# Patient Record
Sex: MALE | Race: BLACK OR AFRICAN AMERICAN | NOT HISPANIC OR LATINO | Employment: UNEMPLOYED | URBAN - METROPOLITAN AREA
[De-identification: names, ages, dates, MRNs, and addresses within clinical notes are randomized per-mention and may not be internally consistent; named-entity substitution may affect disease eponyms.]

---

## 2023-05-02 ENCOUNTER — OFFICE VISIT (OUTPATIENT)
Dept: PRIMARY CARE | Facility: CLINIC | Age: 33
End: 2023-05-02
Payer: COMMERCIAL

## 2023-05-02 VITALS
BODY MASS INDEX: 27.98 KG/M2 | SYSTOLIC BLOOD PRESSURE: 120 MMHG | HEART RATE: 71 BPM | DIASTOLIC BLOOD PRESSURE: 78 MMHG | HEIGHT: 69 IN | OXYGEN SATURATION: 99 % | WEIGHT: 188.9 LBS | TEMPERATURE: 97.6 F

## 2023-05-02 DIAGNOSIS — G56.00 CARPAL TUNNEL SYNDROME, UNSPECIFIED LATERALITY: Primary | ICD-10-CM

## 2023-05-02 DIAGNOSIS — M25.512 LEFT SHOULDER PAIN, UNSPECIFIED CHRONICITY: ICD-10-CM

## 2023-05-02 PROBLEM — R52 PAIN: Status: ACTIVE | Noted: 2023-05-02

## 2023-05-02 PROBLEM — G56.21 CUBITAL TUNNEL SYNDROME ON RIGHT: Status: ACTIVE | Noted: 2023-05-02

## 2023-05-02 PROBLEM — S69.90XA WRIST INJURY: Status: ACTIVE | Noted: 2023-05-02

## 2023-05-02 PROBLEM — S63.054A: Status: ACTIVE | Noted: 2023-05-02

## 2023-05-02 PROBLEM — M53.80 BACK TIGHTNESS: Status: ACTIVE | Noted: 2023-05-02

## 2023-05-02 PROCEDURE — 1036F TOBACCO NON-USER: CPT | Performed by: STUDENT IN AN ORGANIZED HEALTH CARE EDUCATION/TRAINING PROGRAM

## 2023-05-02 PROCEDURE — 99213 OFFICE O/P EST LOW 20 MIN: CPT | Performed by: STUDENT IN AN ORGANIZED HEALTH CARE EDUCATION/TRAINING PROGRAM

## 2023-05-02 RX ORDER — CYCLOBENZAPRINE HCL 10 MG
TABLET ORAL EVERY 8 HOURS
COMMUNITY
Start: 2021-04-20 | End: 2024-03-01 | Stop reason: ALTCHOICE

## 2023-05-02 RX ORDER — DOXYCYCLINE 100 MG/1
100 CAPSULE ORAL 2 TIMES DAILY
COMMUNITY
Start: 2022-08-08 | End: 2024-03-01 | Stop reason: ALTCHOICE

## 2023-05-02 RX ORDER — ARM BRACE
1 EACH MISCELLANEOUS DAILY
Qty: 1 EACH | Refills: 0 | Status: SHIPPED | OUTPATIENT
Start: 2023-05-02

## 2023-05-02 RX ORDER — IBUPROFEN 600 MG/1
600 TABLET ORAL EVERY 6 HOURS PRN
COMMUNITY
Start: 2023-04-17 | End: 2024-03-01 | Stop reason: ALTCHOICE

## 2023-05-02 RX ORDER — PREDNISONE 20 MG/1
40 TABLET ORAL DAILY
Qty: 6 TABLET | Refills: 0 | Status: SHIPPED | OUTPATIENT
Start: 2023-05-02 | End: 2023-05-05

## 2023-05-02 ASSESSMENT — PAIN SCALES - GENERAL: PAINLEVEL: 10-WORST PAIN EVER

## 2023-05-02 NOTE — PROGRESS NOTES
32 years old with shoulder pain and hand numbness DD includes carpal tunnel vs rotator cuff impingement. Will refer for PT. Will order steroid taper.        Discuss with Dr. Enzo Carbajal

## 2023-05-02 NOTE — PROGRESS NOTES
HPI    Left Arm pain/numbness  - Started about 2/3 weeks ago  - Denies any precipitating factors or trauma or falls  - Reports that pain has worsened since initially presentation  - Endorses that pain is worst at night. Reports that sometimes it wakes him up from sleep  - Unable to quantify pain  - Endorses that it feel likes like a pinched nerve  - Reports that he gets sharp pains that radiate down his arm  - Feels like he's arm has been constantly numb  - Was seen in the Urgent Care on 04/26/23; was given IM Toradol for which he said wore off after a couple ho    Review of Systems  - ROS negative unless mentioned in HPI    Physical Exam  Constitutional: Well developed, awake, alert, oriented x3  Head and Face: NCAT  Eyes: Normal external exam, EOMI  ENT: Normal external inspection of ears and nose. Oropharynx normal.  Cardiovascular: RRR, S1/S2, no murmurs, rubs, or gallops, radial pulses +2, no edema of extremities  Pulmonary: CTAB, no respiratory distress.  Abdomen: +BS, soft, non-tender, nondistended, no guarding or rebound, no masses noted  Neuro: A&O x3, CN II-XII grossly intact  MSK: +Phalen's test on the L hand, tender to palpation along the anterior L shoulder. Pain with external rotation of the L shoulder. Pain with empty can test in the L shoulder  Skin- No lesions, contusions, or erythema.  Psychiatric: Judgment intact. Appropriate mood and behavior     32 year old M presenting to the clinic with a chief compliant of L shoulder pain with arm numbness. Symptoms are consistent with carpal tunnel and rotator cuff injury    #Shoulder pain  #Carpal tunnel syndrome  - Given short course of prednisone 40mg every day x3 days  - Physical therapy ordered  - Wrist splint given     Patient to follow up in clinic in 4-6 weeks for continued follow up    Discussed with Dr. Enzo Beebe MD PGY-3  Family Medicine   [facility]

## 2023-05-03 NOTE — PROGRESS NOTES
I reviewed with the resident the medical history and the resident’s findings on physical examination.  I discussed with the resident the patient’s diagnosis and concur with the treatment plan as documented in the resident note.     Eric Giraldo MD

## 2023-06-12 ENCOUNTER — TELEMEDICINE (OUTPATIENT)
Dept: PRIMARY CARE | Facility: CLINIC | Age: 33
End: 2023-06-12
Payer: COMMERCIAL

## 2023-06-12 DIAGNOSIS — G56.02 CARPAL TUNNEL SYNDROME OF LEFT WRIST: Primary | ICD-10-CM

## 2023-06-12 PROCEDURE — 99213 OFFICE O/P EST LOW 20 MIN: CPT | Performed by: FAMILY MEDICINE

## 2023-06-12 NOTE — PROGRESS NOTES
"  Subjective   Chief complaint: Andre Jamil is a 32 y.o. male who presents for Numbness.    HPI:  Here for follow CTS,Reports weakness of his left hand, occasionally dropping things.,  Tingling and numbness in the lateral 3 fingers.  He was ordered a splint but did not get it yet.  He is awaiting evaluation by Ortho hand.      Ros:  Negative other than HPI    Objective   There were no vitals taken for this visit.    PHYSICAL EXAMINATION:  GENERAL: Alert,In no apparent distress  HEENT: Normocephalic, atraumatic. Extraocular movements intact.  NECK: Supple.  CHEST: Non labored breathing  EXTREMITIES: NROM  NEUROLOGIC: Motor Functions Grossly intact      I have reviewed and reconciled the medication list with the patient today.   Current Outpatient Medications:     ibuprofen 600 mg tablet, Take 1 tablet (600 mg) by mouth every 6 hours if needed., Disp: , Rfl:     arm brace (Wrist Brace Medium) misc, 1 Box once daily., Disp: 1 each, Rfl: 0    cyclobenzaprine (Flexeril) 10 mg tablet, Take by mouth every 8 hours., Disp: , Rfl:     doxycycline (Vibramycin) 100 mg capsule, Take 1 capsule (100 mg) by mouth 2 times a day., Disp: , Rfl:      Imaging:  No results found.     Labs reviewed:    No results found for: \"WBC\", \"HGB\", \"HCT\", \"PLT\", \"CHOL\", \"TRIG\", \"HDL\", \"LDLDIRECT\", \"ALT\", \"AST\", \"NA\", \"K\", \"CL\", \"CREATININE\", \"BUN\", \"CO2\", \"TSH\", \"PSA\", \"INR\", \"GLUF\", \"HGBA1C\", \"ALBUR\"      Assessment/Plan   Diagnoses and all orders for this visit:  Carpal tunnel syndrome of left wrist  -     Referral to Physical Medicine Rehab; Future  -     Wrist splint: for CTS  Advised to follow up with ortho and PMR for disability evaluation.    Diagnosis and Management discussed with the patient.  Patient agreeable with plan.  Patient advised to Return to clinic with new or unresolved symptoms.  Eric Giraldo MD    This note was partially generated using the Dragon Voice Recognition System and there may be some incorrect words or wording, " spelling and /or spelling errors, or punctuation errors that were not corrected prior to committing the note to the medical record.

## 2023-12-18 ENCOUNTER — HOSPITAL ENCOUNTER (EMERGENCY)
Facility: HOSPITAL | Age: 33
Discharge: HOME | End: 2023-12-18
Payer: COMMERCIAL

## 2023-12-18 VITALS
TEMPERATURE: 97.2 F | SYSTOLIC BLOOD PRESSURE: 136 MMHG | RESPIRATION RATE: 16 BRPM | HEIGHT: 69 IN | BODY MASS INDEX: 27.4 KG/M2 | DIASTOLIC BLOOD PRESSURE: 88 MMHG | HEART RATE: 73 BPM | WEIGHT: 185 LBS | OXYGEN SATURATION: 98 %

## 2023-12-18 DIAGNOSIS — K64.4 EXTERNAL HEMORRHOID: ICD-10-CM

## 2023-12-18 DIAGNOSIS — K29.00 ACUTE GASTRITIS WITHOUT HEMORRHAGE, UNSPECIFIED GASTRITIS TYPE: Primary | ICD-10-CM

## 2023-12-18 PROCEDURE — 99284 EMERGENCY DEPT VISIT MOD MDM: CPT | Performed by: PHYSICIAN ASSISTANT

## 2023-12-18 PROCEDURE — 99283 EMERGENCY DEPT VISIT LOW MDM: CPT

## 2023-12-18 PROCEDURE — 2500000005 HC RX 250 GENERAL PHARMACY W/O HCPCS: Mod: SE

## 2023-12-18 RX ORDER — ONDANSETRON 4 MG/1
4 TABLET, ORALLY DISINTEGRATING ORAL ONCE
Status: COMPLETED | OUTPATIENT
Start: 2023-12-18 | End: 2023-12-18

## 2023-12-18 RX ORDER — ONDANSETRON 4 MG/1
TABLET, ORALLY DISINTEGRATING ORAL
Status: COMPLETED
Start: 2023-12-18 | End: 2023-12-18

## 2023-12-18 RX ORDER — ONDANSETRON 4 MG/1
4 TABLET, ORALLY DISINTEGRATING ORAL EVERY 8 HOURS PRN
Qty: 30 TABLET | Refills: 0 | Status: SHIPPED | OUTPATIENT
Start: 2023-12-18 | End: 2024-03-01 | Stop reason: ALTCHOICE

## 2023-12-18 RX ADMIN — ONDANSETRON 4 MG: 4 TABLET, ORALLY DISINTEGRATING ORAL at 13:05

## 2023-12-18 RX ADMIN — DIPHENHYDRAMINE HYDROCHLORIDE AND LIDOCAINE HYDROCHLORIDE AND ALUMINUM HYDROXIDE AND MAGNESIUM HYDRO 10 ML: KIT at 13:05

## 2023-12-18 ASSESSMENT — LIFESTYLE VARIABLES
EVER FELT BAD OR GUILTY ABOUT YOUR DRINKING: NO
HAVE YOU EVER FELT YOU SHOULD CUT DOWN ON YOUR DRINKING: NO
HAVE PEOPLE ANNOYED YOU BY CRITICIZING YOUR DRINKING: NO
REASON UNABLE TO ASSESS: NO
EVER HAD A DRINK FIRST THING IN THE MORNING TO STEADY YOUR NERVES TO GET RID OF A HANGOVER: NO

## 2023-12-18 ASSESSMENT — PAIN - FUNCTIONAL ASSESSMENT: PAIN_FUNCTIONAL_ASSESSMENT: 0-10

## 2023-12-18 ASSESSMENT — COLUMBIA-SUICIDE SEVERITY RATING SCALE - C-SSRS
1. IN THE PAST MONTH, HAVE YOU WISHED YOU WERE DEAD OR WISHED YOU COULD GO TO SLEEP AND NOT WAKE UP?: NO
2. HAVE YOU ACTUALLY HAD ANY THOUGHTS OF KILLING YOURSELF?: NO
6. HAVE YOU EVER DONE ANYTHING, STARTED TO DO ANYTHING, OR PREPARED TO DO ANYTHING TO END YOUR LIFE?: NO

## 2023-12-18 NOTE — Clinical Note
Andre Jamil was seen and treated in our emergency department on 12/18/2023.  He may return to work on 12/19/2023.       If you have any questions or concerns, please don't hesitate to call.      Devin Antony PA-C

## 2023-12-18 NOTE — ED PROVIDER NOTES
HPI:  33-year-old male otherwise healthy presents complaining of upset stomach and some blood on the toilet paper when he wiped.  States he had a party on Friday night and drink quite a bit has had some upset stomach since then.  Denies any abdominal pain.  Denies any diarrhea.  States the blood was when he wiped only intermittently and mild amount.  Denies any diarrhea.  Denies any fevers, chills, night sweats or rigors.  States he otherwise feels well.  Denies any regular alcohol use.  Smokes marijuana regularly.      Physical Exam:   GEN: Vitals noted. NAD  EYES:  EOMs grossly intact, anicteric sclera  KELSEA: Mucosa moist.  NECK: Supple.  CARD: RRR  PULMONARY: Moving air well. Clear all lung fields.  ABDOMEN: Soft, no guarding, no rigidity. Nontender. NABS  EXTREMITIES: Full ROM, no pitting edema,   SKIN: Intact, warm and dry  NEURO: Alert and oriented x 3, speech is clear, no obvious deficits noted.   Rectum: Small nonbleeding external hemorrhoid visualized, no blood on REGI    ----------------------------------------------------------------------------------------------------------------------------    MDM:  33-year-old male presenting for upset stomach with rectal bleeding.  On exam he is well-appearing and uncomfortable.  Vital signs stable.  ABD soft NTTP with NABS.  REGI with small external hemorrhoid without any internal blood on digit.  Given his alcohol consumption prior to his symptoms are most likely gastritis, he is given Zofran and BMX.  Tolerated p.o. very well without difficulty.  Told to use stool softeners and Preparation H with sitz bath.  Follow-up with PCP for persistent symptoms.  Return precautions reviewed.    No orders to display     Labs Reviewed - No data to display  Diagnoses as of 12/18/23 1301   Acute gastritis without hemorrhage, unspecified gastritis type   External hemorrhoid      ----------------------------------------------------------------------------------------------------------------------------    This note was dictated using a speech recognition program.  While an attempt was made at proof reading to minimize errors, minor errors in transcription may be present call for questions.     Devin Antony PA-C  12/18/23 5000

## 2023-12-18 NOTE — DISCHARGE INSTRUCTIONS
Take the Zofran for any nausea.  Try to stay well-hydrated  Try the brat diet: Bananas, rice, applesauce, toast.  For the hemorrhoids try stool softeners and Preparation H over-the-counter.  If you have persistent symptoms follow-up with your PCP, if you need a new 1 call the number listed below.

## 2024-03-01 ENCOUNTER — OFFICE VISIT (OUTPATIENT)
Dept: PRIMARY CARE | Facility: CLINIC | Age: 34
End: 2024-03-01
Payer: COMMERCIAL

## 2024-03-01 VITALS
DIASTOLIC BLOOD PRESSURE: 88 MMHG | BODY MASS INDEX: 28.19 KG/M2 | TEMPERATURE: 98.2 F | HEIGHT: 69 IN | SYSTOLIC BLOOD PRESSURE: 136 MMHG | RESPIRATION RATE: 18 BRPM | OXYGEN SATURATION: 98 % | HEART RATE: 96 BPM | WEIGHT: 190.3 LBS

## 2024-03-01 DIAGNOSIS — M62.830 BACK MUSCLE SPASM: Primary | ICD-10-CM

## 2024-03-01 DIAGNOSIS — K62.5 BRBPR (BRIGHT RED BLOOD PER RECTUM): ICD-10-CM

## 2024-03-01 PROCEDURE — 1036F TOBACCO NON-USER: CPT | Performed by: STUDENT IN AN ORGANIZED HEALTH CARE EDUCATION/TRAINING PROGRAM

## 2024-03-01 PROCEDURE — 99213 OFFICE O/P EST LOW 20 MIN: CPT | Performed by: STUDENT IN AN ORGANIZED HEALTH CARE EDUCATION/TRAINING PROGRAM

## 2024-03-01 RX ORDER — METHOCARBAMOL 500 MG/1
500 TABLET, FILM COATED ORAL 4 TIMES DAILY PRN
Qty: 60 TABLET | Refills: 2 | Status: SHIPPED | OUTPATIENT
Start: 2024-03-01 | End: 2024-04-30

## 2024-03-01 ASSESSMENT — PAIN SCALES - GENERAL: PAINLEVEL: 0-NO PAIN

## 2024-03-01 NOTE — PROGRESS NOTES
"Subjective   Patient ID: Andre Jamil is a 33 y.o. male who presents for Establish Care (physical).    Had some rectal bleeding in December and was diagnosed with hemorrhoids. Has noted that the bleeding is back again and is concerned. Started again a few days ago. Light spots on the toilet paper. A little bit on the stool but mostly on the toilet paper. Has bowel movements multiple times a day. Never has to strain. Doesn't drink very much alcohol, but seems to notice the bleeding after drinking. Feels like stool is generally normal consistency. Started using baby wipes due to concern that he might be wiping too hard. No abdominal pain; no pain with bowel movements. No family history of Crohn's disease, UC, colon cancer.    Has a history of pinched nerve in the R shoulder with rotator cuff problems. Now is having problems in the back. Feels a soreness in the L back that's making it uncomfortable to walk. Interfering with sleep. Pain started about 1 week ago. Pain is sharp and seems to come out of nowhere. Pain was initially higher in the mid-back, now it's happening lower in the back. Happens during the day or at night. Hasn't tried anything for pain yet.      Review of Systems  As above in HPI    Objective   /88 (BP Location: Left arm, Patient Position: Sitting, BP Cuff Size: Adult)   Pulse 96   Temp 36.8 °C (98.2 °F) (Temporal)   Resp 18   Ht 1.753 m (5' 9\")   Wt 86.3 kg (190 lb 4.8 oz)   SpO2 98%   BMI 28.10 kg/m²  Body mass index is 28.1 kg/m².    Physical Exam  Vitals reviewed.   Constitutional:       General: He is not in acute distress.     Appearance: Normal appearance.   HENT:      Head: Normocephalic and atraumatic.   Eyes:      Conjunctiva/sclera: Conjunctivae normal.   Cardiovascular:      Rate and Rhythm: Normal rate and regular rhythm.      Heart sounds: No murmur heard.     No friction rub. No gallop.   Pulmonary:      Effort: Pulmonary effort is normal. No respiratory distress.      " Breath sounds: Normal breath sounds. No wheezing, rhonchi or rales.   Abdominal:      Palpations: Abdomen is soft.      Tenderness: There is no abdominal tenderness. There is no guarding or rebound.   Genitourinary:     Rectum: No anal fissure or external hemorrhoid.   Musculoskeletal:         General: Normal range of motion.      Cervical back: Neck supple.      Comments: No localized tenderness of the midline or paraspinal back including the SI joints and gluteus   Skin:     General: Skin is warm and dry.   Neurological:      Mental Status: He is alert. Mental status is at baseline.   Psychiatric:         Mood and Affect: Affect normal.       Assessment/Plan   Andre Jamil is a 32yo male with no significant PMH who presents for follow up visit.    Problem List Items Addressed This Visit             ICD-10-CM    Back muscle spasm - Primary M62.830     -no clear abnormalities on exam and given atraumatic nature of pain I suspect symptoms are largely d/t muscle spasms  -encouraged stretching exercises, core strengthening  -trial of muscle relaxant         Relevant Medications    methocarbamol (Robaxin) 500 mg tablet    BRBPR (bright red blood per rectum) K62.5     -no clear abnormalities on exam  -given spotting I suspect internal hemorrhoids  -given age, lack of family history, and small volume of bleeding there is no indication for GI evaluation at this time        Follow up in 3 months for reevaluation and annual physical.    Patient discussed with Dr. Moncada.    Campos Garces MD   PGY-3

## 2024-03-01 NOTE — PROGRESS NOTES
Andre Jamil  [id number]    Patient was discussed with resident Campos Garces MD and with attending Dr. Moncada. Agreeable with plan as discussed.     BRBPR; Hx of hemorrhoids  No other alarm symptoms.  Recommended diet enriched in fiber.  Consider a bowel regimen.    Back pain secondary to musculoskeletal etiology - muscle relaxants; physical activity involving stretching exercises.    Ruben Jackson MD  Family Medicine  PGY3

## 2024-03-03 PROBLEM — K62.5 BRBPR (BRIGHT RED BLOOD PER RECTUM): Status: ACTIVE | Noted: 2024-03-03

## 2024-03-03 PROBLEM — M62.830 BACK MUSCLE SPASM: Status: ACTIVE | Noted: 2024-03-03

## 2024-03-03 PROBLEM — K29.00 ACUTE GASTRITIS: Status: ACTIVE | Noted: 2023-12-18

## 2024-03-03 PROBLEM — S69.90XA WRIST INJURY: Status: RESOLVED | Noted: 2023-05-02 | Resolved: 2024-03-03

## 2024-03-03 PROBLEM — K64.4 EXTERNAL HEMORRHOIDS: Status: ACTIVE | Noted: 2023-12-18

## 2024-03-03 PROBLEM — S62.339A FRACTURE OF NECK OF METACARPAL BONE: Status: RESOLVED | Noted: 2024-03-03 | Resolved: 2024-03-03

## 2024-03-03 PROBLEM — S63.054A: Status: RESOLVED | Noted: 2023-05-02 | Resolved: 2024-03-03

## 2024-03-03 PROBLEM — S62.339A FRACTURE OF NECK OF METACARPAL BONE: Status: ACTIVE | Noted: 2024-03-03

## 2024-03-03 PROBLEM — K29.00 ACUTE GASTRITIS: Status: RESOLVED | Noted: 2023-12-18 | Resolved: 2024-03-03

## 2024-03-03 PROBLEM — M62.830 BACK MUSCLE SPASM: Status: ACTIVE | Noted: 2023-05-02

## 2024-03-04 NOTE — PROGRESS NOTES
I reviewed the resident/fellow's documentation and discussed the patient with the resident/fellow. I agree with the resident/fellow's medical decision making as documented in the note.    Jesse Moncada MD

## 2024-03-04 NOTE — ASSESSMENT & PLAN NOTE
-no clear abnormalities on exam  -given spotting I suspect internal hemorrhoids  -given age, lack of family history, and small volume of bleeding there is no indication for GI evaluation at this time

## 2024-03-04 NOTE — ASSESSMENT & PLAN NOTE
-no clear abnormalities on exam and given atraumatic nature of pain I suspect symptoms are largely d/t muscle spasms  -encouraged stretching exercises, core strengthening  -trial of muscle relaxant

## 2024-06-07 ENCOUNTER — HOSPITAL ENCOUNTER (EMERGENCY)
Facility: HOSPITAL | Age: 34
Discharge: LEFT BEFORE DISCHARGE | End: 2024-06-08
Payer: COMMERCIAL

## 2024-06-07 ENCOUNTER — CLINICAL SUPPORT (OUTPATIENT)
Dept: EMERGENCY MEDICINE | Facility: HOSPITAL | Age: 34
End: 2024-06-07
Payer: COMMERCIAL

## 2024-06-07 VITALS
OXYGEN SATURATION: 100 % | WEIGHT: 190 LBS | RESPIRATION RATE: 18 BRPM | BODY MASS INDEX: 28.14 KG/M2 | HEIGHT: 69 IN | SYSTOLIC BLOOD PRESSURE: 114 MMHG | HEART RATE: 114 BPM | DIASTOLIC BLOOD PRESSURE: 52 MMHG | TEMPERATURE: 98.5 F

## 2024-06-07 LAB
ATRIAL RATE: 102 BPM
P AXIS: 41 DEGREES
P OFFSET: 199 MS
P ONSET: 150 MS
PR INTERVAL: 140 MS
Q ONSET: 220 MS
QRS COUNT: 17 BEATS
QRS DURATION: 92 MS
QT INTERVAL: 316 MS
QTC CALCULATION(BAZETT): 411 MS
QTC FREDERICIA: 377 MS
R AXIS: 54 DEGREES
T AXIS: -4 DEGREES
T OFFSET: 378 MS
VENTRICULAR RATE: 102 BPM

## 2024-06-07 PROCEDURE — 99281 EMR DPT VST MAYX REQ PHY/QHP: CPT

## 2024-06-07 PROCEDURE — 99283 EMERGENCY DEPT VISIT LOW MDM: CPT

## 2024-06-07 PROCEDURE — 93005 ELECTROCARDIOGRAM TRACING: CPT

## 2024-06-07 ASSESSMENT — COLUMBIA-SUICIDE SEVERITY RATING SCALE - C-SSRS
2. HAVE YOU ACTUALLY HAD ANY THOUGHTS OF KILLING YOURSELF?: NO
1. IN THE PAST MONTH, HAVE YOU WISHED YOU WERE DEAD OR WISHED YOU COULD GO TO SLEEP AND NOT WAKE UP?: NO
6. HAVE YOU EVER DONE ANYTHING, STARTED TO DO ANYTHING, OR PREPARED TO DO ANYTHING TO END YOUR LIFE?: NO

## 2024-06-07 ASSESSMENT — PAIN DESCRIPTION - PAIN TYPE: TYPE: ACUTE PAIN

## 2024-06-07 ASSESSMENT — PAIN - FUNCTIONAL ASSESSMENT: PAIN_FUNCTIONAL_ASSESSMENT: 0-10

## 2024-06-07 ASSESSMENT — PAIN SCALES - GENERAL: PAINLEVEL_OUTOF10: 0 - NO PAIN

## 2024-06-08 NOTE — ED TRIAGE NOTES
Patient presents to the ED for evaluation of dizziness that began today. He states that he ate an edible today and since then has been feeling dizzy and nauseated. Patient has never eaten an edible before. He states that he is feeling dehydrated.

## 2024-11-06 ENCOUNTER — CLINICAL SUPPORT (OUTPATIENT)
Dept: EMERGENCY MEDICINE | Facility: HOSPITAL | Age: 34
End: 2024-11-06
Payer: COMMERCIAL

## 2024-11-06 ENCOUNTER — HOSPITAL ENCOUNTER (EMERGENCY)
Facility: HOSPITAL | Age: 34
Discharge: HOME | End: 2024-11-06
Payer: COMMERCIAL

## 2024-11-06 ENCOUNTER — APPOINTMENT (OUTPATIENT)
Dept: RADIOLOGY | Facility: HOSPITAL | Age: 34
End: 2024-11-06
Payer: COMMERCIAL

## 2024-11-06 VITALS
WEIGHT: 185 LBS | OXYGEN SATURATION: 96 % | RESPIRATION RATE: 16 BRPM | SYSTOLIC BLOOD PRESSURE: 128 MMHG | BODY MASS INDEX: 27.4 KG/M2 | HEART RATE: 84 BPM | DIASTOLIC BLOOD PRESSURE: 78 MMHG | HEIGHT: 69 IN | TEMPERATURE: 97.4 F

## 2024-11-06 DIAGNOSIS — R07.9 CHEST PAIN, UNSPECIFIED TYPE: Primary | ICD-10-CM

## 2024-11-06 DIAGNOSIS — M79.18 MUSCULOSKELETAL PAIN: ICD-10-CM

## 2024-11-06 DIAGNOSIS — R07.89 CHEST WALL PAIN: ICD-10-CM

## 2024-11-06 LAB
ALBUMIN SERPL BCP-MCNC: 4.7 G/DL (ref 3.4–5)
ALP SERPL-CCNC: 55 U/L (ref 33–120)
ALT SERPL W P-5'-P-CCNC: 16 U/L (ref 10–52)
ANION GAP SERPL CALC-SCNC: 12 MMOL/L (ref 10–20)
AST SERPL W P-5'-P-CCNC: 18 U/L (ref 9–39)
BASOPHILS # BLD AUTO: 0.02 X10*3/UL (ref 0–0.1)
BASOPHILS NFR BLD AUTO: 0.6 %
BILIRUB SERPL-MCNC: 0.6 MG/DL (ref 0–1.2)
BUN SERPL-MCNC: 15 MG/DL (ref 6–23)
CALCIUM SERPL-MCNC: 9.7 MG/DL (ref 8.6–10.6)
CARDIAC TROPONIN I PNL SERPL HS: 3 NG/L (ref 0–53)
CHLORIDE SERPL-SCNC: 104 MMOL/L (ref 98–107)
CO2 SERPL-SCNC: 29 MMOL/L (ref 21–32)
CREAT SERPL-MCNC: 1.14 MG/DL (ref 0.5–1.3)
EGFRCR SERPLBLD CKD-EPI 2021: 86 ML/MIN/1.73M*2
EOSINOPHIL # BLD AUTO: 0.02 X10*3/UL (ref 0–0.7)
EOSINOPHIL NFR BLD AUTO: 0.6 %
ERYTHROCYTE [DISTWIDTH] IN BLOOD BY AUTOMATED COUNT: 11.9 % (ref 11.5–14.5)
GLUCOSE SERPL-MCNC: 101 MG/DL (ref 74–99)
HCT VFR BLD AUTO: 41.9 % (ref 41–52)
HGB BLD-MCNC: 14.9 G/DL (ref 13.5–17.5)
HOLD SPECIMEN: NORMAL
IMM GRANULOCYTES # BLD AUTO: 0.01 X10*3/UL (ref 0–0.7)
IMM GRANULOCYTES NFR BLD AUTO: 0.3 % (ref 0–0.9)
LYMPHOCYTES # BLD AUTO: 1.33 X10*3/UL (ref 1.2–4.8)
LYMPHOCYTES NFR BLD AUTO: 43.2 %
MAGNESIUM SERPL-MCNC: 2.11 MG/DL (ref 1.6–2.4)
MCH RBC QN AUTO: 30.9 PG (ref 26–34)
MCHC RBC AUTO-ENTMCNC: 35.6 G/DL (ref 32–36)
MCV RBC AUTO: 87 FL (ref 80–100)
MONOCYTES # BLD AUTO: 0.27 X10*3/UL (ref 0.1–1)
MONOCYTES NFR BLD AUTO: 8.8 %
NEUTROPHILS # BLD AUTO: 1.43 X10*3/UL (ref 1.2–7.7)
NEUTROPHILS NFR BLD AUTO: 46.5 %
NRBC BLD-RTO: 0 /100 WBCS (ref 0–0)
PLATELET # BLD AUTO: 276 X10*3/UL (ref 150–450)
POTASSIUM SERPL-SCNC: 4 MMOL/L (ref 3.5–5.3)
PROT SERPL-MCNC: 7.5 G/DL (ref 6.4–8.2)
RBC # BLD AUTO: 4.82 X10*6/UL (ref 4.5–5.9)
SODIUM SERPL-SCNC: 141 MMOL/L (ref 136–145)
WBC # BLD AUTO: 3.1 X10*3/UL (ref 4.4–11.3)

## 2024-11-06 PROCEDURE — 93005 ELECTROCARDIOGRAM TRACING: CPT

## 2024-11-06 PROCEDURE — 99283 EMERGENCY DEPT VISIT LOW MDM: CPT | Mod: 25

## 2024-11-06 PROCEDURE — 36415 COLL VENOUS BLD VENIPUNCTURE: CPT | Performed by: NURSE PRACTITIONER

## 2024-11-06 PROCEDURE — 85025 COMPLETE CBC W/AUTO DIFF WBC: CPT | Performed by: NURSE PRACTITIONER

## 2024-11-06 PROCEDURE — 84484 ASSAY OF TROPONIN QUANT: CPT | Performed by: NURSE PRACTITIONER

## 2024-11-06 PROCEDURE — 71046 X-RAY EXAM CHEST 2 VIEWS: CPT

## 2024-11-06 PROCEDURE — 80053 COMPREHEN METABOLIC PANEL: CPT | Performed by: NURSE PRACTITIONER

## 2024-11-06 PROCEDURE — 83735 ASSAY OF MAGNESIUM: CPT | Performed by: NURSE PRACTITIONER

## 2024-11-06 PROCEDURE — 71046 X-RAY EXAM CHEST 2 VIEWS: CPT | Performed by: RADIOLOGY

## 2024-11-06 ASSESSMENT — HEART SCORE
TROPONIN: LESS THAN OR EQUAL TO NORMAL LIMIT
HISTORY: SLIGHTLY SUSPICIOUS
AGE: <45
HEART SCORE: 1
RISK FACTORS: 1-2 RISK FACTORS
ECG: NORMAL

## 2024-11-06 NOTE — PROGRESS NOTES
Patient was handed off to me from the previous team. For full history, physical, and prior ED course, please see previous provider note prior to patient handoff. This is an addendum to the record.  Sign out received from Kecia Mead at 15:00    Andre Jamil   presented to Emergency Department  for   Chief Complaint   Patient presents with    Chest Pain     Medical work up included labs, diagnostic testing.   Labs:  Labs Reviewed   CBC WITH AUTO DIFFERENTIAL - Abnormal       Result Value    WBC 3.1 (*)     nRBC 0.0      RBC 4.82      Hemoglobin 14.9      Hematocrit 41.9      MCV 87      MCH 30.9      MCHC 35.6      RDW 11.9      Platelets 276      Neutrophils % 46.5      Immature Granulocytes %, Automated 0.3      Lymphocytes % 43.2      Monocytes % 8.8      Eosinophils % 0.6      Basophils % 0.6      Neutrophils Absolute 1.43      Immature Granulocytes Absolute, Automated 0.01      Lymphocytes Absolute 1.33      Monocytes Absolute 0.27      Eosinophils Absolute 0.02      Basophils Absolute 0.02     COMPREHENSIVE METABOLIC PANEL - Abnormal    Glucose 101 (*)     Sodium 141      Potassium 4.0      Chloride 104      Bicarbonate 29      Anion Gap 12      Urea Nitrogen 15      Creatinine 1.14      eGFR 86      Calcium 9.7      Albumin 4.7      Alkaline Phosphatase 55      Total Protein 7.5      AST 18      Bilirubin, Total 0.6      ALT 16     MAGNESIUM - Normal    Magnesium 2.11     SERIAL TROPONIN-INITIAL - Normal    Troponin I, High Sensitivity (CMC) 3      Narrative:     Less than 99th percentile of normal range cutoff-  Female and children under 18 years old <35 ng/L; Male <54 ng/L: Negative  Repeat testing should be performed if clinically indicated.     Female and children under 18 years old  ng/L; Male  ng/L:  Consistent with possible cardiac damage and possible increased clinical   risk. Serial measurements may help to assess extent of myocardial damage.     >120 ng/L: Consistent with  cardiac damage, increased clinical risk and  myocardial infarction. Serial measurements may help assess extent of   myocardial damage.      NOTE: Children less than 1 year old may have higher baseline troponin   levels and results should be interpreted in conjunction with the overall   clinical context.    NOTE: Troponin I testing is performed using a different   testing methodology at [facility] than at other   [facility] hospitals. Direct result comparisons should only   be made within the same method.     TROPONIN SERIES- (INITIAL, 1 HR)    Narrative:     The following orders were created for panel order Troponin I Series, High Sensitivity (0, 1 HR).  Procedure                               Abnormality         Status                     ---------                               -----------         ------                     Troponin I, High Sensitiv...[[id number]]  Normal              Final result               Troponin, High Sensitivit...[[id number]]                                                   Please view results for these tests on the individual orders.   SERIAL TROPONIN, 1 HOUR        Imaging:  XR chest 2 views   Final Result   1.  No evidence of acute cardiopulmonary process.        I personally reviewed the images/study and I agree with the findings   as stated by Resident Calli Arrington. This study was interpreted at   [facility], [place], [place].        MACRO:   None        Signed by: Ryan Milner 11/6/2024 1:15 PM   Dictation workstation:   [id number]          At time of sign out pending: Lab work and chest xray results       What occured after transition of care: CBC did not show any anemia or leukocytosis.  CMP showed normal renal and hepatic functions without critical electrolyte disturbances.  Troponin was normal.  Chest x-ray showed no evidence of cardiopulmonary pathology.  EKG showed no evidence of STEMI or acute ischemia.  On reassessment, patient  states that he is currently pain-free and would like to go home.  Discussed results of ED findings and counseled patient to follow-up with his PCP.  Discussed ED return precautions.  Patient verbalized understanding and was agreeable plan of care.  Discharged in stable condition.         Carline Acevedo PA-C

## 2024-11-06 NOTE — Clinical Note
Andre Jamil was seen and treated in our emergency department on 11/6/2024.  He may return to work on 11/08/2024.       If you have any questions or concerns, please don't hesitate to call.      Kecia Moncada, APRN-CNP

## 2024-11-06 NOTE — ED TRIAGE NOTES
"States he has chest tightness and that he feels like there is \" a knot\" on his chest x a few days   "

## 2024-11-06 NOTE — ED PROVIDER NOTES
HPI   Chief Complaint   Patient presents with    Chest Pain         History provided by:  Patient   used: No      34-year-old male presents ED for evaluation of left-sided chest tightness onset a couple days ago.  Patient states that he has had this in the past.  He states the pain worsens with movement and palpation.  He has taken no medications at home for symptoms.  Denies being stressed.  Denies any trauma, fall, injury or anticoagulation use.  Denies any illicit drug use but does endorse tobacco smoking and marijuana smoking.  Denies any history of hypertension, hyperlipidemia or known cardiac disease.  Denies any recent head cold.  Denies any additional symptoms or complaints this time.    ROS is otherwise negative unless stated above.      Patient History   Past Medical History:   Diagnosis Date    Dislocation of carpometacarpal joint, right, initial encounter 05/02/2023    Fracture of neck of metacarpal bone 03/03/2024     No past surgical history on file.  No family history on file.  Social History     Tobacco Use    Smoking status: Never    Smokeless tobacco: Never   Substance Use Topics    Alcohol use: Never    Drug use: Yes     Types: Marijuana       Physical Exam   ED Triage Vitals [11/06/24 1226]   Temperature Heart Rate Respirations BP   36.3 °C (97.4 °F) 84 16 128/78      Pulse Ox Temp src Heart Rate Source Patient Position   96 % -- -- --      BP Location FiO2 (%)     -- --       Physical Exam  VS: As documented in the triage note and EMR flowsheet from this visit were reviewed.    GEN: NAD, nontoxic, well-appearing, ambulates without difficulty  EYES: PERRLA  HEENT: Airway patent  CARD: RRR, nontender chest, no crepitus deformities, no JVD, no murmurs rubs or gallops ; No edema noted.  Positive pulses bilaterally throughout.  Capillary refill less than 3 seconds.  No abnormal redness, warmth, tenderness or swelling noted to bilateral lower extremities.  PULMONARY: Clear all lung  fields. Moving air well, Nonlabored, no accessory muscle use, able to speak complete sentences  ABDOMEN: Abdomen soft, non-distended, no rebound, no guarding. Bowel sounds normal in all 4 quadrants. No tenderness to palpation.  No CVA tenderness.  No masses or organomegaly noted.  No evidence of peritonitis.   : deferred  MUSK: Spine appears normal, range of motion is not limited, positive tenderness to palpation of sternal region.  Strength 5 out of 5 equal bilaterally throughout.  No step-offs, deformities or additional signs of trauma noted.  No spinal/midline tenderness to palpation  SKIN: Skin normal color for race, warm, dry and intact. No evidence of trauma. No rash noted.  NEURO: Alert and oriented x 3, speech is clear, no obvious deficits noted.   PSYCH: Alert and oriented to person, place, time/situation. normal mood and affect. No apparent risk to self or others. Thoughts are linear.  Does not appear decompensated.  Does not appear internally stimulated.  LYMPH: No adenopathy or splenomegaly. No cervical, supraclavicular or inguinal lymphadenopathy.    ED Course & MDM   Diagnoses as of 11/06/24 1514   Chest pain, unspecified type   Chest wall pain   Musculoskeletal pain                 No data recorded     [place] Coma Scale Score: 15 (11/06/24 1225 : Laura Del Rio RN)                           Medical Decision Making    This is a 34-year-old male who presents for evaluation sudden onset left-sided chest pain a couple days ago.  he is ambulate independently without difficulty or dyspnea and is well-appearing.  Chest pain is reproducible which is reassuring.  he has no clinical evidence of DVT, PE or heart failure.  Will obtain laboratory studies, EKG and chest x-ray to further assess.  Declined need for medications at this time.    Initial workup was reviewed.  He remained hemodynamically stable throughout my ED course of care.  Findings and plan were discussed with patient and he was agreeable for  plan and acknowledged understanding.  All questions and concerns addressed prior to the end of my shift.  Patient signed out to oncoming provider with likely disposition home with outpatient follow-up.    EKG Interpretation: Normal sinus rhythm at a rate of 77, , QRS 90, QTc 3 decel without evidence of STEMI or ischemia    Differential Diagnoses Considered: Anxiety, ACS, musculoskeletal pain, nonspecific chest pain, costochondritis    Chronic Medical Conditions Significantly Affecting Care: none    External Records Reviewed: I reviewed recent and relevant outside records including: PCP notes, prior discharge summary, previous radiologic studies, HIE    Independent Interpretation of Studies:  I independently interpreted: Chest x-ray revealed no acute cardiopulmonary process    Escalation of Care:  Signed out to oncoming provider pending workup results and final disposition with likely discharge home    Social Determinants of Health Significantly Affecting Care:  none    Prescription Drug Consideration: N/A    Diagnostic testing considered: Considered a CT PE but patient PERCs out    Discussion of Management with Other Providers:   I discussed the patient/results with: Oncoming provider    *Please note that portions of this note may have been completed with a voice recognition program.  Efforts were made to edit the dictations but occasionally, words are mis-transcribed.  Procedure  Procedures     ALEXANDER Rosado-GISELLA  11/06/24 1514       Kecia Moncada, ALEXANDER-GISELLA  11/06/24 1514

## 2024-11-07 LAB
ATRIAL RATE: 77 BPM
P AXIS: 31 DEGREES
P OFFSET: 205 MS
P ONSET: 153 MS
PR INTERVAL: 136 MS
Q ONSET: 221 MS
QRS COUNT: 13 BEATS
QRS DURATION: 90 MS
QT INTERVAL: 342 MS
QTC CALCULATION(BAZETT): 387 MS
QTC FREDERICIA: 371 MS
R AXIS: 60 DEGREES
T AXIS: 30 DEGREES
T OFFSET: 392 MS
VENTRICULAR RATE: 77 BPM

## 2024-11-08 ENCOUNTER — HOSPITAL ENCOUNTER (EMERGENCY)
Facility: HOSPITAL | Age: 34
Discharge: HOME | End: 2024-11-09
Attending: EMERGENCY MEDICINE
Payer: COMMERCIAL

## 2024-11-08 VITALS
BODY MASS INDEX: 27.4 KG/M2 | HEART RATE: 97 BPM | HEIGHT: 69 IN | TEMPERATURE: 97.9 F | DIASTOLIC BLOOD PRESSURE: 84 MMHG | WEIGHT: 185 LBS | SYSTOLIC BLOOD PRESSURE: 137 MMHG | OXYGEN SATURATION: 98 % | RESPIRATION RATE: 18 BRPM

## 2024-11-08 DIAGNOSIS — N20.1 URETERAL CALCULUS: Primary | ICD-10-CM

## 2024-11-08 DIAGNOSIS — Z20.2 STD EXPOSURE: ICD-10-CM

## 2024-11-08 PROCEDURE — 99284 EMERGENCY DEPT VISIT MOD MDM: CPT

## 2024-11-08 PROCEDURE — 99284 EMERGENCY DEPT VISIT MOD MDM: CPT | Performed by: EMERGENCY MEDICINE

## 2024-11-08 ASSESSMENT — COLUMBIA-SUICIDE SEVERITY RATING SCALE - C-SSRS
6. HAVE YOU EVER DONE ANYTHING, STARTED TO DO ANYTHING, OR PREPARED TO DO ANYTHING TO END YOUR LIFE?: NO
1. IN THE PAST MONTH, HAVE YOU WISHED YOU WERE DEAD OR WISHED YOU COULD GO TO SLEEP AND NOT WAKE UP?: NO
2. HAVE YOU ACTUALLY HAD ANY THOUGHTS OF KILLING YOURSELF?: NO

## 2024-11-08 ASSESSMENT — PAIN SCALES - GENERAL: PAINLEVEL_OUTOF10: 0 - NO PAIN

## 2024-11-08 ASSESSMENT — PAIN - FUNCTIONAL ASSESSMENT: PAIN_FUNCTIONAL_ASSESSMENT: 0-10

## 2024-11-09 ENCOUNTER — APPOINTMENT (OUTPATIENT)
Dept: RADIOLOGY | Facility: HOSPITAL | Age: 34
End: 2024-11-09
Payer: COMMERCIAL

## 2024-11-09 LAB
APPEARANCE UR: CLEAR
BILIRUB UR STRIP.AUTO-MCNC: NEGATIVE MG/DL
COLOR UR: NORMAL
GLUCOSE UR STRIP.AUTO-MCNC: NORMAL MG/DL
HOLD SPECIMEN: NORMAL
KETONES UR STRIP.AUTO-MCNC: NEGATIVE MG/DL
LEUKOCYTE ESTERASE UR QL STRIP.AUTO: NEGATIVE
NITRITE UR QL STRIP.AUTO: NEGATIVE
PH UR STRIP.AUTO: 7 [PH]
PROT UR STRIP.AUTO-MCNC: NORMAL MG/DL
RBC # UR STRIP.AUTO: NEGATIVE /UL
RBC #/AREA URNS AUTO: NORMAL /HPF
SP GR UR STRIP.AUTO: 1.02
UROBILINOGEN UR STRIP.AUTO-MCNC: NORMAL MG/DL
WBC #/AREA URNS AUTO: NORMAL /HPF

## 2024-11-09 PROCEDURE — 2500000004 HC RX 250 GENERAL PHARMACY W/ HCPCS (ALT 636 FOR OP/ED): Mod: SE | Performed by: EMERGENCY MEDICINE

## 2024-11-09 PROCEDURE — 74176 CT ABD & PELVIS W/O CONTRAST: CPT | Performed by: RADIOLOGY

## 2024-11-09 PROCEDURE — 2500000001 HC RX 250 WO HCPCS SELF ADMINISTERED DRUGS (ALT 637 FOR MEDICARE OP): Mod: SE | Performed by: EMERGENCY MEDICINE

## 2024-11-09 PROCEDURE — 81001 URINALYSIS AUTO W/SCOPE: CPT

## 2024-11-09 PROCEDURE — 74176 CT ABD & PELVIS W/O CONTRAST: CPT

## 2024-11-09 PROCEDURE — 96372 THER/PROPH/DIAG INJ SC/IM: CPT | Performed by: EMERGENCY MEDICINE

## 2024-11-09 RX ORDER — CEFTRIAXONE 500 MG/1
500 INJECTION, POWDER, FOR SOLUTION INTRAMUSCULAR; INTRAVENOUS ONCE
Status: COMPLETED | OUTPATIENT
Start: 2024-11-09 | End: 2024-11-09

## 2024-11-09 RX ORDER — TAMSULOSIN HYDROCHLORIDE 0.4 MG/1
0.4 CAPSULE ORAL DAILY
Qty: 10 CAPSULE | Refills: 0 | Status: SHIPPED | OUTPATIENT
Start: 2024-11-09 | End: 2024-11-19

## 2024-11-09 RX ORDER — IBUPROFEN 600 MG/1
600 TABLET ORAL EVERY 6 HOURS PRN
Qty: 120 TABLET | Refills: 0 | Status: SHIPPED | OUTPATIENT
Start: 2024-11-09 | End: 2024-12-09

## 2024-11-09 RX ORDER — DOXYCYCLINE HYCLATE 100 MG
100 TABLET ORAL ONCE
Status: COMPLETED | OUTPATIENT
Start: 2024-11-09 | End: 2024-11-09

## 2024-11-09 RX ORDER — DOXYCYCLINE 100 MG/1
100 TABLET ORAL 2 TIMES DAILY
Qty: 20 TABLET | Refills: 0 | Status: SHIPPED | OUTPATIENT
Start: 2024-11-09 | End: 2024-11-19

## 2024-11-09 RX ORDER — METRONIDAZOLE 500 MG/1
2000 TABLET ORAL ONCE
Status: COMPLETED | OUTPATIENT
Start: 2024-11-09 | End: 2024-11-09

## 2024-11-09 RX ORDER — NAPROXEN 500 MG/1
500 TABLET ORAL ONCE
Status: COMPLETED | OUTPATIENT
Start: 2024-11-09 | End: 2024-11-09

## 2024-11-09 RX ADMIN — CEFTRIAXONE SODIUM 500 MG: 500 INJECTION, POWDER, FOR SOLUTION INTRAMUSCULAR; INTRAVENOUS at 03:03

## 2024-11-09 RX ADMIN — METRONIDAZOLE 2000 MG: 500 TABLET ORAL at 03:02

## 2024-11-09 RX ADMIN — NAPROXEN 500 MG: 500 TABLET ORAL at 03:02

## 2024-11-09 RX ADMIN — DOXYCYCLINE HYCLATE 100 MG: 100 TABLET, COATED ORAL at 03:02

## 2024-11-09 NOTE — ED PROVIDER NOTES
Emergency Department Provider Note        History of Present Illness     History provided by: Patient  Limitations to History: None    HPI:  Patient is a 34-year-old male presenting to the ED for left flank pain.  Patient reports that he was here on Wednesday with similar features and at that time blood work was obtained as well as an EKG that were nonconcerning.  Patient states that yesterday he started developing increased urinary frequency.  Patient states that the pain is not related to eating, and he does not know what makes it worse or better.  Patient states that the left flank pain is described as a tightness. He denies any chest pain or pain anywhere else. Patient denies any history of a kidney stone and denies any blood in the urine. No trauma, falls, or injuries. No passing out. No headache, dizziness, vision changes, neck pain, chest pain, shortness of breath, nausea, vomiting, diarrhea, constipation, numbness, tingling, fevers, or chills. He denies any testicular or scrotal pain or swelling. No discharge. No sick contacts or recent travels.     Physical Exam   Triage vitals:  T 36.1 °C (96.9 °F)  HR 82  /77  RR 16  O2 96 % None (Room air)    Physical Exam  Constitutional:       General: He is not in acute distress.     Appearance: Normal appearance.   HENT:      Head: Normocephalic and atraumatic.      Mouth/Throat:      Mouth: Mucous membranes are moist.   Eyes:      General: No scleral icterus.     Extraocular Movements: Extraocular movements intact.   Cardiovascular:      Rate and Rhythm: Normal rate and regular rhythm.   Pulmonary:      Effort: Pulmonary effort is normal. No respiratory distress.      Breath sounds: Normal breath sounds. No wheezing.   Abdominal:      General: Abdomen is flat. There is no distension.      Palpations: Abdomen is soft.      Tenderness: There is left CVA tenderness. There is no guarding or rebound.   Musculoskeletal:         General: No swelling or deformity.  Normal range of motion.      Cervical back: Normal range of motion and neck supple. No rigidity.   Skin:     General: Skin is warm.      Findings: No rash.   Neurological:      General: No focal deficit present.      Mental Status: He is alert.      Cranial Nerves: No cranial nerve deficit.      Sensory: No sensory deficit.      Motor: No weakness.   Psychiatric:         Mood and Affect: Mood normal.         Behavior: Behavior normal.          Medical Decision Making & ED Course   Medical Decision Making:  Patient is a 34-year-old male who presents to the ED for left flank pain.  Patient states that he has left flank pain and was recently seen in the ED on Wednesday for similar features at the time he also had chest pain EKG and lab work was negative at that time.  Patient is reporting now having increased urinary frequency.  Patient is denying any blood in the urine and states that the pain is a tight sensation that is not related to urination, food or movement. In the Emergency Department, hospital records were reviewed and IV access was obtained.  The patient is afebrile with stable vital signs. The patient is in no respiratory distress, satting well on room air. On my exam patient had left CVA tenderness.  His abdomen is soft, non-peritonitic. Urinalysis was ordered for further evaluation. Patient states that pain is similar as on Wednesday and previous labs 2 days ago shows a creatinine within normal limits and no leukocytosis.  Differential is broad and includes but not limited to acute cystitis, pyelonephritis, nephrolithiasis, aortic pathology, intra-abdominal process, infection, or musculoskeletal pain.  Less likely an aortic pathology patient given lack of risk factors and patient has 2+ DP and radial pulses.  Patient denies any scrotal or testicular pain or swelling so lower concern for torsion. Urinalysis was ordered and showed no evidence of any UTI. Patient did request STD testing with urine which was  sent with the results pending. Patient was offered but declined HIV or syphilis testing. Patient did want empiric treatment for any possible STDs so he was treated with IM ceftriaxone, 2 g oral flagyl, and one dose of doxycyline in the ED. He will be written script for doxycyline for homegoing. Patient was given a dose of naproxen to treat his pain. Given his left flank pain, CT abdomen/pelvis was ordered for further evaluation and pending at time of sign-out to oncoming ED team. Patient remains stable at this time.            EKG Independent Interpretation: EKG not obtained        The patient was discussed with the following consultants/services: None      ED Course as of 11/09/24 1753   Sat Nov 09, 2024   0230 Patient urinalysis negative for UTI, STD testing was ordered, patient was prophylactically treated for STDs and a CT abdomen was obtained [TS]      ED Course User Index  [TS] Yamile Gu MD         Diagnoses as of 11/09/24 1753   Ureteral calculus   STD exposure          Disposition   Patient was signed out to oncoming provider pending completion of their work-up.  Please see the next provider's transition of care note for the remainder of the patient's care.     Procedures   Procedures    Patient seen and discussed with ED attending physician.    Yamile Gu MD  Emergency Medicine       Yamile Gu MD  Resident  11/09/24 9750    I saw and evaluated the patient. I personally obtained the key and critical portions of the history and physical exam or was physically present for key and critical portions performed by the resident/fellow. I reviewed the resident/fellow's documentation and discussed the patient with the resident/fellow. I agree with the resident/fellow's medical decision making as documented in the note.    MD Kaylyn Randall MD  11/09/24 2192

## 2024-11-09 NOTE — DISCHARGE INSTRUCTIONS
You were seen emerged apartment today for concerns of urinary frequency and left flank pain.  You are found to have a 3 mm ureteral stone.  You have not given prescriptions for tamsulosin and NSAIDs, which should be taken as prescribed.  In addition, as discussed, you should try to stay well-hydrated to help flush the stone out.  A urology referral has also been placed for you to follow-up with as needed.  In addition, a prescription for doxycycline has been provided for STD exposure, please take as prescribed.  You are safely discharged at this time.  However, should you have any new, worsening, or concerning symptoms report back to the emergency department.

## 2024-11-09 NOTE — PROGRESS NOTES
Emergency Department Transition of Care Note       Signout   I received Andre Jamil in signout from Dr. Gu.  Please see the ED Provider Note for all HPI, PE and MDM up to the time of signout at 0200 hrs.  This is in addition to the primary record.    In brief Andre Jamil is an 34 y.o. male presenting for left flank pain.    At the time of signout we were awaiting:  Results of CT scan and disposition.    ED Course & Medical Decision Making   Medical Decision Making:  Under my care,     CT scan showed 3 mm calcific density in the left lower pelvis, which may represent a nonobstructing ureteral stone. No hydroureteronephrosis. Patient's urinalysis was negative for any UTI. Patient was informed of the results. He remained stable with soft, non-peritonitic abdominal exam. He is tolerating po intake.    Patient given prescriptions for doxycycline for empiric STD treatment, as well tamsulosin, NSAIDs, and a urology referral.  Patient's vitals remained stable and within normal limits.  Patient discharged in good condition with strict return precautions.  Patient understood and was agreeable with the plan. They should feel free to return to the Emergency Department at any time should their condition worsen or should they have any questions or concerns.       ED Course:  ED Course as of 11/09/24 0348   Sat Nov 09, 2024   0230 Patient urinalysis negative for UTI, STD testing was ordered, patient was prophylactically treated for STDs and a CT abdomen was obtained [TS]      ED Course User Index  [TS] Yamile Gu MD         Diagnoses as of 11/09/24 0348   Ureteral calculus       Disposition   As a result of the work-up, the patient was discharged home.  he was informed of his diagnosis and instructed to come back with any concerns or worsening of condition.  he and was agreeable to the plan as discussed above.  he was given the opportunity to ask questions.  All of the patient's questions were  answered.    Procedures   Procedures    Patient seen and discussed with ED attending physician.    Steven Perez, DO  Emergency Medicine

## 2024-11-09 NOTE — ED TRIAGE NOTES
Patient reports increased urinary frequency and left lower side tightness that has been going on for 4 days.

## 2024-12-26 ENCOUNTER — OFFICE VISIT (OUTPATIENT)
Dept: UROLOGY | Facility: CLINIC | Age: 34
End: 2024-12-26
Payer: COMMERCIAL

## 2024-12-26 VITALS
SYSTOLIC BLOOD PRESSURE: 123 MMHG | RESPIRATION RATE: 16 BRPM | DIASTOLIC BLOOD PRESSURE: 78 MMHG | WEIGHT: 190.6 LBS | HEART RATE: 80 BPM | BODY MASS INDEX: 28.23 KG/M2 | HEIGHT: 69 IN | OXYGEN SATURATION: 99 % | TEMPERATURE: 97.9 F

## 2024-12-26 DIAGNOSIS — N20.1 URETERAL CALCULUS: Primary | ICD-10-CM

## 2024-12-26 LAB
POC APPEARANCE, URINE: CLEAR
POC BILIRUBIN, URINE: NEGATIVE
POC BLOOD, URINE: NEGATIVE
POC COLOR, URINE: YELLOW
POC GLUCOSE, URINE: NEGATIVE MG/DL
POC KETONES, URINE: NEGATIVE MG/DL
POC LEUKOCYTES, URINE: NEGATIVE
POC NITRITE,URINE: NEGATIVE
POC PH, URINE: 6.5 PH
POC PROTEIN, URINE: NEGATIVE MG/DL
POC SPECIFIC GRAVITY, URINE: 1.02
POC UROBILINOGEN, URINE: 0.2 EU/DL

## 2024-12-26 PROCEDURE — 81003 URINALYSIS AUTO W/O SCOPE: CPT | Performed by: PHYSICIAN ASSISTANT

## 2024-12-26 PROCEDURE — 99214 OFFICE O/P EST MOD 30 MIN: CPT | Performed by: PHYSICIAN ASSISTANT

## 2024-12-26 PROCEDURE — 3008F BODY MASS INDEX DOCD: CPT | Performed by: PHYSICIAN ASSISTANT

## 2024-12-26 PROCEDURE — 99204 OFFICE O/P NEW MOD 45 MIN: CPT | Performed by: PHYSICIAN ASSISTANT

## 2024-12-26 ASSESSMENT — ENCOUNTER SYMPTOMS
NEUROLOGICAL NEGATIVE: 1
RESPIRATORY NEGATIVE: 1
CONSTITUTIONAL NEGATIVE: 1
EYES NEGATIVE: 1
CARDIOVASCULAR NEGATIVE: 1
ENDOCRINE NEGATIVE: 1
BACK PAIN: 1
GASTROINTESTINAL NEGATIVE: 1
PSYCHIATRIC NEGATIVE: 1
HEMATOLOGIC/LYMPHATIC NEGATIVE: 1
ALLERGIC/IMMUNOLOGIC NEGATIVE: 1

## 2024-12-26 ASSESSMENT — PAIN SCALES - GENERAL: PAINLEVEL_OUTOF10: 0-NO PAIN

## 2024-12-26 NOTE — PROGRESS NOTES
Subjective   Patient ID: Andre Jamil is a 34 y.o. male who presents for New Patient Visit (NPV).  HPI  Patient is a 34-year-old male presents after ED visit for questionable left ureteral stone.    About a couple of months ago patient started having recurrent left side tightening which was seen a couple of times in the ED.  At the last visit about a month and a half ago a CT scan was obtained which showed a 3 mm calcification in the pelvis questionable left ureteral stone.  No hydronephrosis was noted.  A few days after he went to the emergency room he reports having severe sharp left lower quadrant pain radiating towards the pelvis.  He reports having it was difficult to to void around that time a bowel movement and then he felt better.  Since then he is having intermittent tightness in the same area and is not sure if the stone is still there.    UA negative    === 11/08/24 ===    CT ABDOMEN PELVIS WO IV CONTRAST    - Impression -  1.  3 mm calcific density in the left lower pelvis, which may  represent a nonobstructing ureteral stone. No hydroureteronephrosis.    I personally reviewed the images/study and I agree with the findings  as stated by resident Yuan Whitlock. This study was interpreted  at [place], [place].    MACRO:  Alert Yuan Whitlock discussed the significance and urgency of this  critical finding by epic chat with  MYRIAM DELATORRE on 11/9/2024 at 3:30  am.  (**-RCF-**) Findings:  See findings.    Signed by: Evan Finkelstein 11/9/2024 4:05 AM  Dictation workstation:   [id number]    Review of Systems   Constitutional: Negative.    HENT: Negative.     Eyes: Negative.    Respiratory: Negative.     Cardiovascular: Negative.    Gastrointestinal: Negative.    Endocrine: Negative.    Genitourinary: Negative.    Musculoskeletal:  Positive for back pain.   Skin: Negative.    Allergic/Immunologic: Negative.    Neurological: Negative.    Hematological:  Negative.    Psychiatric/Behavioral: Negative.         Objective   Physical Exam  Constitutional:       General: He is not in acute distress.     Appearance: Normal appearance.   HENT:      Head: Normocephalic and atraumatic.      Nose: Nose normal.      Mouth/Throat:      Mouth: Mucous membranes are moist.   Cardiovascular:      Rate and Rhythm: Normal rate.   Pulmonary:      Effort: Pulmonary effort is normal.   Abdominal:      General: Abdomen is flat.      Palpations: Abdomen is soft.      Tenderness: There is no right CVA tenderness or left CVA tenderness.   Musculoskeletal:      Cervical back: Normal range of motion.   Neurological:      Mental Status: He is alert.         Assessment/Plan     Questionable left ureteral stone  I reviewed CT images and discussed results with patient. I discussed the stone could be a pevis calculi vs a non nonobstructing ureteral stone.  I discussed if it is a ureteral stone it is small enough to pass.    Since he had the severe sharp pains before and still has persistent tightness I advise repeating a CT scan of the abdomen and pelvis without contrast.  If there is no change, then the stone is a pelvic calcification and would not be the reason for feeling the sides tightness.  I also advised him to follow-up with his primary care provider for further evaluation.    Patient will follow-up in 4 weeks to to review results as needed       Pauline Desir PA-C 12/26/24 3:21 PM

## 2025-03-18 ENCOUNTER — HOSPITAL ENCOUNTER (EMERGENCY)
Facility: HOSPITAL | Age: 35
Discharge: HOME | End: 2025-03-18
Attending: EMERGENCY MEDICINE
Payer: COMMERCIAL

## 2025-03-18 ENCOUNTER — DOCUMENTATION (OUTPATIENT)
Dept: EMERGENCY MEDICINE | Facility: HOSPITAL | Age: 35
End: 2025-03-18
Payer: COMMERCIAL

## 2025-03-18 ENCOUNTER — HOSPITAL ENCOUNTER (EMERGENCY)
Facility: HOSPITAL | Age: 35
Discharge: OTHER INSTITUTION | End: 2025-03-18
Payer: COMMERCIAL

## 2025-03-18 VITALS
DIASTOLIC BLOOD PRESSURE: 91 MMHG | OXYGEN SATURATION: 99 % | HEART RATE: 83 BPM | TEMPERATURE: 97.9 F | HEIGHT: 69 IN | WEIGHT: 190 LBS | RESPIRATION RATE: 18 BRPM | BODY MASS INDEX: 28.14 KG/M2 | SYSTOLIC BLOOD PRESSURE: 134 MMHG

## 2025-03-18 DIAGNOSIS — A64 STD (MALE): ICD-10-CM

## 2025-03-18 DIAGNOSIS — N48.89 PENILE PAIN: Primary | ICD-10-CM

## 2025-03-18 LAB
APPEARANCE UR: CLEAR
BILIRUB UR STRIP.AUTO-MCNC: NEGATIVE MG/DL
C TRACH RRNA SPEC QL NAA+PROBE: NEGATIVE
COLOR UR: ABNORMAL
GLUCOSE UR STRIP.AUTO-MCNC: NORMAL MG/DL
HIV 1+2 AB+HIV1 P24 AG SERPL QL IA: NONREACTIVE
HOLD SPECIMEN: NORMAL
HOLD SPECIMEN: NORMAL
KETONES UR STRIP.AUTO-MCNC: NEGATIVE MG/DL
LEUKOCYTE ESTERASE UR QL STRIP.AUTO: NEGATIVE
N GONORRHOEA DNA SPEC QL PROBE+SIG AMP: NEGATIVE
NITRITE UR QL STRIP.AUTO: NEGATIVE
PH UR STRIP.AUTO: 7 [PH]
PROT UR STRIP.AUTO-MCNC: NEGATIVE MG/DL
RBC # UR STRIP.AUTO: NEGATIVE MG/DL
SP GR UR STRIP.AUTO: 1.03
T VAGINALIS RRNA SPEC QL NAA+PROBE: NEGATIVE
UROBILINOGEN UR STRIP.AUTO-MCNC: ABNORMAL MG/DL

## 2025-03-18 PROCEDURE — 36415 COLL VENOUS BLD VENIPUNCTURE: CPT

## 2025-03-18 PROCEDURE — 81003 URINALYSIS AUTO W/O SCOPE: CPT

## 2025-03-18 PROCEDURE — 87491 CHLMYD TRACH DNA AMP PROBE: CPT

## 2025-03-18 PROCEDURE — 99284 EMERGENCY DEPT VISIT MOD MDM: CPT | Performed by: EMERGENCY MEDICINE

## 2025-03-18 PROCEDURE — 87661 TRICHOMONAS VAGINALIS AMPLIF: CPT

## 2025-03-18 PROCEDURE — 96372 THER/PROPH/DIAG INJ SC/IM: CPT

## 2025-03-18 PROCEDURE — 4500999001 HC ED NO CHARGE

## 2025-03-18 PROCEDURE — 87389 HIV-1 AG W/HIV-1&-2 AB AG IA: CPT

## 2025-03-18 PROCEDURE — 2500000001 HC RX 250 WO HCPCS SELF ADMINISTERED DRUGS (ALT 637 FOR MEDICARE OP): Mod: SE

## 2025-03-18 PROCEDURE — 2500000004 HC RX 250 GENERAL PHARMACY W/ HCPCS (ALT 636 FOR OP/ED): Mod: SE

## 2025-03-18 RX ORDER — DOXYCYCLINE HYCLATE 100 MG
100 TABLET ORAL ONCE
Status: COMPLETED | OUTPATIENT
Start: 2025-03-18 | End: 2025-03-18

## 2025-03-18 RX ORDER — METRONIDAZOLE 500 MG/1
2000 TABLET ORAL ONCE
Status: COMPLETED | OUTPATIENT
Start: 2025-03-18 | End: 2025-03-18

## 2025-03-18 RX ORDER — DOXYCYCLINE 100 MG/1
100 TABLET ORAL 2 TIMES DAILY
Qty: 20 TABLET | Refills: 0 | Status: SHIPPED | OUTPATIENT
Start: 2025-03-18 | End: 2025-03-28

## 2025-03-18 RX ORDER — CEFTRIAXONE 1 G/1
1 INJECTION, POWDER, FOR SOLUTION INTRAMUSCULAR; INTRAVENOUS ONCE
Status: COMPLETED | OUTPATIENT
Start: 2025-03-18 | End: 2025-03-18

## 2025-03-18 RX ADMIN — METRONIDAZOLE 2000 MG: 500 TABLET ORAL at 03:52

## 2025-03-18 RX ADMIN — CEFTRIAXONE SODIUM 1 G: 1 INJECTION, POWDER, FOR SOLUTION INTRAMUSCULAR; INTRAVENOUS at 03:52

## 2025-03-18 RX ADMIN — DOXYCYCLINE HYCLATE 100 MG: 100 TABLET, FILM COATED ORAL at 03:52

## 2025-03-18 ASSESSMENT — PAIN SCALES - GENERAL: PAINLEVEL_OUTOF10: 0 - NO PAIN

## 2025-03-18 ASSESSMENT — COLUMBIA-SUICIDE SEVERITY RATING SCALE - C-SSRS
1. IN THE PAST MONTH, HAVE YOU WISHED YOU WERE DEAD OR WISHED YOU COULD GO TO SLEEP AND NOT WAKE UP?: NO
6. HAVE YOU EVER DONE ANYTHING, STARTED TO DO ANYTHING, OR PREPARED TO DO ANYTHING TO END YOUR LIFE?: NO
2. HAVE YOU ACTUALLY HAD ANY THOUGHTS OF KILLING YOURSELF?: NO

## 2025-03-18 ASSESSMENT — PAIN - FUNCTIONAL ASSESSMENT: PAIN_FUNCTIONAL_ASSESSMENT: 0-10

## 2025-03-18 NOTE — ED TRIAGE NOTES
Patient presents to the ED for tightness in testicles that started this morning. Denies traumatic injury

## 2025-03-18 NOTE — ED PROVIDER NOTES
Emergency Department Provider Note        History of Present Illness     History provided by: Patient  Limitations to History: None    HPI:  Patient is a 34-year-old male presenting to the ED for penile pain.  Patient states that he would like STD checked.  Patient states that he felt a pain in his penis today.  Patient Nuys any trauma to the area no hematuria, dysuria, malodorous urine.  Patient has no blood from the urethral meatus or discharge.  Patient states he like to be checked for STDs as he had an exposure a few days ago.  Physical Exam   Triage vitals:  T 36.1 °C (96.9 °F)  HR 82  /77  RR 16  O2 96 % None (Room air)    Physical Exam  Constitutional:       Appearance: Normal appearance.   HENT:      Head: Normocephalic.   Eyes:      Extraocular Movements: Extraocular movements intact.   Cardiovascular:      Rate and Rhythm: Normal rate.   Pulmonary:      Effort: Pulmonary effort is normal.   Abdominal:      General: Abdomen is flat.   Genitourinary:     Penis: Normal.       Testes: Normal.      Comments: No scrotal swelling  Musculoskeletal:         General: Normal range of motion.      Cervical back: Normal range of motion.   Skin:     General: Skin is warm.   Neurological:      Mental Status: He is alert. Mental status is at baseline.   Psychiatric:         Mood and Affect: Mood normal.         Behavior: Behavior normal.          Medical Decision Making & ED Course   Medical Decision Making:    Patient 34-year-old male presented the ED for penile pain states that he would like to be STD checked as he had exposure a few days ago.  Patient denies any trauma to the area with no hematuria, dysuria or malodorous urine.  Patient had no blood from urethral meatus or discharge.  Examination shows no signs of trauma, bruising, rashes, ulcerations or warts.  Patient had no pain with elevation of the scrotum.  Will STD test as well as prophylactically treat.  Patient was given ceftriaxone, doxycycline and  Flagyl in the ED and was discharged with doxycycline.    EKG Independent Interpretation: EKG not obtained        The patient was discussed with the following consultants/services: None      Diagnoses as of 03/18/25 0404   Penile pain   STD (male)          Disposition   As a result of the work-up, the patient was discharged home.  he was informed of his diagnosis and instructed to come back with any concerns or worsening of condition.  he and was agreeable to the plan as discussed above.  he was given the opportunity to ask questions.  All of the patient's questions were answered.    Procedures   Procedures    Patient seen and discussed with ED attending physician.    Yamile Gu MD  Emergency Medicine     Yamile Gu MD  Resident  03/18/25 5953

## 2025-03-18 NOTE — PROGRESS NOTES
3/18/2025     Test:   Discussed HIV and HCV testing with the patient in the ED.   Patient received HIV and HCV test today    Test Result:  HIV Negative  HCV Indeterminate  SYPHILIS Indeterminate    Result notification:  Patient was notified of their test results on 03/18/25 via Telephone (after verifying 3 identifiers)    Follow-up plan:   Patient was referred to Other on [Date]  Patient has appointment at [clinic name] on [Date]  Barriers to attending appointment: [free text, none]  Missed appointments: [unfilled]     Signed  MARY Sheikh   HIV/HCV [facility]OCUS Program  Certified Community Health Worker - Research  Please contact me via email or MyChart with questions

## 2025-04-16 ENCOUNTER — HOSPITAL ENCOUNTER (EMERGENCY)
Facility: HOSPITAL | Age: 35
Discharge: HOME | End: 2025-04-16
Payer: COMMERCIAL

## 2025-04-16 VITALS
BODY MASS INDEX: 28.14 KG/M2 | RESPIRATION RATE: 16 BRPM | WEIGHT: 190 LBS | HEART RATE: 60 BPM | SYSTOLIC BLOOD PRESSURE: 138 MMHG | DIASTOLIC BLOOD PRESSURE: 89 MMHG | HEIGHT: 69 IN | OXYGEN SATURATION: 99 % | TEMPERATURE: 97.9 F

## 2025-04-16 DIAGNOSIS — R10.84 GENERALIZED ABDOMINAL PAIN: Primary | ICD-10-CM

## 2025-04-16 DIAGNOSIS — K92.1 BLOODY STOOL: ICD-10-CM

## 2025-04-16 LAB
ALBUMIN SERPL BCP-MCNC: 4.8 G/DL (ref 3.4–5)
ALP SERPL-CCNC: 54 U/L (ref 33–120)
ALT SERPL W P-5'-P-CCNC: 22 U/L (ref 10–52)
ANION GAP SERPL CALC-SCNC: 12 MMOL/L (ref 10–20)
APPEARANCE UR: CLEAR
APTT PPP: 29 SECONDS (ref 26–36)
AST SERPL W P-5'-P-CCNC: 15 U/L (ref 9–39)
BASOPHILS # BLD AUTO: 0.02 X10*3/UL (ref 0–0.1)
BASOPHILS NFR BLD AUTO: 0.7 %
BILIRUB SERPL-MCNC: 0.4 MG/DL (ref 0–1.2)
BILIRUB UR STRIP.AUTO-MCNC: NEGATIVE MG/DL
BUN SERPL-MCNC: 15 MG/DL (ref 6–23)
CALCIUM SERPL-MCNC: 9.8 MG/DL (ref 8.6–10.6)
CHLORIDE SERPL-SCNC: 105 MMOL/L (ref 98–107)
CO2 SERPL-SCNC: 26 MMOL/L (ref 21–32)
COLOR UR: NORMAL
CREAT SERPL-MCNC: 1.05 MG/DL (ref 0.5–1.3)
EGFRCR SERPLBLD CKD-EPI 2021: >90 ML/MIN/1.73M*2
EOSINOPHIL # BLD AUTO: 0.02 X10*3/UL (ref 0–0.7)
EOSINOPHIL NFR BLD AUTO: 0.7 %
ERYTHROCYTE [DISTWIDTH] IN BLOOD BY AUTOMATED COUNT: 12 % (ref 11.5–14.5)
GLUCOSE SERPL-MCNC: 87 MG/DL (ref 74–99)
GLUCOSE UR STRIP.AUTO-MCNC: NORMAL MG/DL
HCT VFR BLD AUTO: 43.5 % (ref 41–52)
HGB BLD-MCNC: 15 G/DL (ref 13.5–17.5)
IMM GRANULOCYTES # BLD AUTO: 0.01 X10*3/UL (ref 0–0.7)
IMM GRANULOCYTES NFR BLD AUTO: 0.3 % (ref 0–0.9)
INR PPP: 1 (ref 0.9–1.1)
KETONES UR STRIP.AUTO-MCNC: NEGATIVE MG/DL
LEUKOCYTE ESTERASE UR QL STRIP.AUTO: NEGATIVE
LYMPHOCYTES # BLD AUTO: 1.34 X10*3/UL (ref 1.2–4.8)
LYMPHOCYTES NFR BLD AUTO: 45.6 %
MCH RBC QN AUTO: 30.9 PG (ref 26–34)
MCHC RBC AUTO-ENTMCNC: 34.5 G/DL (ref 32–36)
MCV RBC AUTO: 90 FL (ref 80–100)
MONOCYTES # BLD AUTO: 0.4 X10*3/UL (ref 0.1–1)
MONOCYTES NFR BLD AUTO: 13.6 %
NEUTROPHILS # BLD AUTO: 1.15 X10*3/UL (ref 1.2–7.7)
NEUTROPHILS NFR BLD AUTO: 39.1 %
NITRITE UR QL STRIP.AUTO: NEGATIVE
NRBC BLD-RTO: 0 /100 WBCS (ref 0–0)
PH UR STRIP.AUTO: 6.5 [PH]
PLATELET # BLD AUTO: 247 X10*3/UL (ref 150–450)
POTASSIUM SERPL-SCNC: 4.1 MMOL/L (ref 3.5–5.3)
PROT SERPL-MCNC: 7.9 G/DL (ref 6.4–8.2)
PROT UR STRIP.AUTO-MCNC: NEGATIVE MG/DL
PROTHROMBIN TIME: 10.5 SECONDS (ref 9.8–12.4)
RBC # BLD AUTO: 4.85 X10*6/UL (ref 4.5–5.9)
RBC # UR STRIP.AUTO: NEGATIVE MG/DL
SODIUM SERPL-SCNC: 139 MMOL/L (ref 136–145)
SP GR UR STRIP.AUTO: 1.02
UROBILINOGEN UR STRIP.AUTO-MCNC: NORMAL MG/DL
WBC # BLD AUTO: 2.9 X10*3/UL (ref 4.4–11.3)

## 2025-04-16 PROCEDURE — 85025 COMPLETE CBC W/AUTO DIFF WBC: CPT | Performed by: PHYSICIAN ASSISTANT

## 2025-04-16 PROCEDURE — 81003 URINALYSIS AUTO W/O SCOPE: CPT | Performed by: PHYSICIAN ASSISTANT

## 2025-04-16 PROCEDURE — 99284 EMERGENCY DEPT VISIT MOD MDM: CPT | Performed by: PHYSICIAN ASSISTANT

## 2025-04-16 PROCEDURE — 36415 COLL VENOUS BLD VENIPUNCTURE: CPT | Performed by: PHYSICIAN ASSISTANT

## 2025-04-16 PROCEDURE — 99283 EMERGENCY DEPT VISIT LOW MDM: CPT

## 2025-04-16 PROCEDURE — 85610 PROTHROMBIN TIME: CPT | Performed by: PHYSICIAN ASSISTANT

## 2025-04-16 PROCEDURE — 80053 COMPREHEN METABOLIC PANEL: CPT | Performed by: PHYSICIAN ASSISTANT

## 2025-04-16 ASSESSMENT — PAIN - FUNCTIONAL ASSESSMENT: PAIN_FUNCTIONAL_ASSESSMENT: 0-10

## 2025-04-16 ASSESSMENT — PAIN DESCRIPTION - DESCRIPTORS: DESCRIPTORS: DISCOMFORT

## 2025-04-16 ASSESSMENT — PAIN DESCRIPTION - LOCATION
LOCATION: ABDOMEN
LOCATION: ABDOMEN

## 2025-04-16 ASSESSMENT — COLUMBIA-SUICIDE SEVERITY RATING SCALE - C-SSRS
6. HAVE YOU EVER DONE ANYTHING, STARTED TO DO ANYTHING, OR PREPARED TO DO ANYTHING TO END YOUR LIFE?: NO
2. HAVE YOU ACTUALLY HAD ANY THOUGHTS OF KILLING YOURSELF?: NO
1. IN THE PAST MONTH, HAVE YOU WISHED YOU WERE DEAD OR WISHED YOU COULD GO TO SLEEP AND NOT WAKE UP?: NO

## 2025-04-16 ASSESSMENT — PAIN SCALES - GENERAL
PAINLEVEL_OUTOF10: 5 - MODERATE PAIN
PAINLEVEL_OUTOF10: 0 - NO PAIN

## 2025-04-16 NOTE — ED TRIAGE NOTES
Abd Pain x 1 week, for the past few days was some blood in the stool. State that he had this problem in the past when he had hemorrhoids.

## 2025-04-16 NOTE — ED PROVIDER NOTES
"Chief Complaint   Patient presents with    Abdominal Pain     HPI:   Andre Jamil is an 34 y.o. male that denies any significant prior medical history who presents to the ED for evaluation of abdominal pain x 1 week that has gotten progressively worse over the past 2 to 3 days.  He localizes it to the middle of his abdomen.  Describes it as \"sharp and cramping\".  Usually presents before need to have BM.  Has BM and then abdominal pain resolves.  Said he is having do have BM 2-3 times per day, which is normal for him.  Today he noticed blood in his stool and on the tissue when he wiped.  Said he has had hemorrhoids in the past and is not sure if this is hemorrhoidal pain.  Currently, abdominal pain is 0/10.  Was around 5/10 when it occurs.  Has not taken any medication for the pain.  Denies pain with defecation, rectal intercourse.  Denies history of GI bleed.  Denies gingival bleeding, epistaxis, hematuria, hematemesis.  Denies NSAID use; is not on anticoagulants.  Says he has been drinking tequila a few shots per day over the past week and has also had a poor diet because he is fixing up his house and does not have a kitchen right now.  Eating mostly fast food.  He is unsure of family history of colon cancer.  Has never had a colonoscopy.  He denies any chest pain, shortness of breath, nausea, vomiting, dysuria, testicular pain or swelling, fever, chills.  Patient said he is here because he has 2 daughters and wants to get checked out making sure he is safe because he googled his symptoms and read it could be cancer.    Medications: Denies any  Soc HX: Alcohol, tobacco, marijuana  RX Allergies[1]: Denies any  Medical History[2]  Surgical History[3]  Family History[4] Unknown     Physical Exam  Vitals and nursing note reviewed.   Constitutional:       General: He is not in acute distress.     Appearance: Normal appearance. He is not ill-appearing or toxic-appearing.   HENT:      Right Ear: External ear normal.    "   Left Ear: External ear normal.   Eyes:      Pupils: Pupils are equal, round, and reactive to light.   Cardiovascular:      Rate and Rhythm: Normal rate and regular rhythm.      Pulses: Normal pulses.      Heart sounds: Normal heart sounds.   Pulmonary:      Effort: Pulmonary effort is normal. No respiratory distress.      Breath sounds: Normal breath sounds.   Abdominal:      General: Abdomen is flat. Bowel sounds are normal.      Palpations: Abdomen is soft.      Tenderness: There is no abdominal tenderness. There is no right CVA tenderness, left CVA tenderness, guarding or rebound. Negative signs include Granda's sign and McBurney's sign.      Hernia: No hernia is present.   Genitourinary:     Comments: (Chaperone RN Karyna) no external hemorrhoid.  Patient declined internal rectal exam  Musculoskeletal:         General: No deformity or signs of injury. Normal range of motion.      Cervical back: Normal range of motion.   Lymphadenopathy:      Cervical: No cervical adenopathy.   Skin:     General: Skin is warm and dry.      Findings: No bruising.   Neurological:      Mental Status: He is alert.      Cranial Nerves: No cranial nerve deficit.     VS: As documented in the triage note and EMR flowsheet from this visit were reviewed.    External Records Reviewed: I reviewed recent and relevant outside records including: Reviewed ED provider note 3/18/2025.  Patient seen for penile pain and STD check.  Had normal HIV screen, negative gonorrhea, chlamydia, trichomonas      Medical Decision Making:   ED Course as of 04/16/25 2114 Wed Apr 16, 2025   1848 Vitals Reviewed: Afebrile. Hypertensive. Not tachycardic nor tachypneic. No hypoxia.   [KA]   1902 Patient is a 34-year-old male who presents to the ED for evaluation of abdominal pain and dark stools.  On exam abdomen is soft, nontender, nondistended.  He has no CVA tenderness.  Symmetric pulses with good cap refill.  We discussed imaging including both x-ray imaging  and CT imaging of the abdomen pelvis.  Through shared decision making, decided against imaging at this time.  Patient is concerned about radiation exposure and on my physical exam his abdomen is benign.  Will obtain labs, occult stool.  Patient says the pain is currently 0/10 does not want any medication for pain. Have asked nursing staff to find patient room for rectal exam. [KA]   1937 RN notified me that we are unable to obtain occult stool tests in the ED.  They discontinued them after COVID.  Chaperoned rectal exam does not show evidence of any external hemorrhoids, nor fissures.  Patient requested not to have internal rectal exam. [KA]   2108 Personally viewed labs.  Urine normal.  Coags normal.  CBC with normal hemoglobin, leukopenia which appears similar to baseline.  Normal CMP. [KA]   2113 Discussed results with patient.  He said he has no pain at this time.  I recommended he follow-up with his primary care doctor to discuss this test or colonoscopy.  Recommended dietary changes to increase water and fiber intake.  Advised return to ED for any new or worsening symptoms.  He was agreeable. [KA]      ED Course User Index  [KA] Hemalatha Swartz PA-C         Diagnoses as of 04/16/25 2114   Generalized abdominal pain   Bloody stool      Escalation of Care: Appropriate for outpatient management    Counseling: Spoke with the patient and discussed today´s findings, in addition to providing specific details for the plan of care and expected course.  Patient was given the opportunity to ask questions.    Discussed return precautions and importance of follow-up.  Advised to follow-up with PCP.  Advised to return to the ED for changing or worsening symptoms, new symptoms, complaint specific precautions, and precautions listed on the discharge paperwork.  Educated on the common potential side effects of medications prescribed.    I advised the patient that the emergency evaluation and treatment provided today doesn't  end their need for medical care. It is very important that they follow-up with their primary care provider or other specialist as instructed.    The plan of care was mutually agreed upon with the patient. The patient and/or family were given the opportunity to ask questions. All questions asked today in the ED were answered to the best of my ability with today's information.    I specifically advised the patient to return to the ED for changing or worsening symptoms, worrisome new symptoms, or for any complaint specific precautions listed on the discharge paperwork.    This patient was cared for in the setting of nationwide stress on resources and staffing.    This report was transcribed using voice recognition software.  Every effort was made to ensure accuracy, however, inadvertently computerized transcription errors may be present.       [1] No Known Allergies  [2]   Past Medical History:  Diagnosis Date    Dislocation of carpometacarpal joint, right, initial encounter 05/02/2023    Fracture of neck of metacarpal bone 03/03/2024   [3] History reviewed. No pertinent surgical history.  [4] No family history on file.       Hemalatha Swartz PA-C  04/16/25 2121

## 2025-04-17 LAB — HOLD SPECIMEN: NORMAL

## 2025-04-17 NOTE — DISCHARGE INSTRUCTIONS
Please follow-up with your primary care doctor within the next 2 to 5 days.  Discuss obtaining either a fit test (stool test) or colonoscopy with your PCP.  Return to ER for any new or worsening symptoms.

## 2025-04-18 ENCOUNTER — HOSPITAL ENCOUNTER (EMERGENCY)
Facility: HOSPITAL | Age: 35
Discharge: HOME | End: 2025-04-18
Attending: EMERGENCY MEDICINE
Payer: COMMERCIAL

## 2025-04-18 ENCOUNTER — APPOINTMENT (OUTPATIENT)
Dept: PRIMARY CARE | Facility: CLINIC | Age: 35
End: 2025-04-18
Payer: COMMERCIAL

## 2025-04-18 VITALS
OXYGEN SATURATION: 95 % | DIASTOLIC BLOOD PRESSURE: 86 MMHG | BODY MASS INDEX: 25.9 KG/M2 | HEART RATE: 81 BPM | RESPIRATION RATE: 16 BRPM | TEMPERATURE: 98 F | SYSTOLIC BLOOD PRESSURE: 131 MMHG | HEIGHT: 71 IN | WEIGHT: 185 LBS

## 2025-04-18 DIAGNOSIS — K62.5 RECTAL BLEEDING: Primary | ICD-10-CM

## 2025-04-18 LAB
BASOPHILS # BLD AUTO: 0.02 X10*3/UL (ref 0–0.1)
BASOPHILS NFR BLD AUTO: 0.7 %
EOSINOPHIL # BLD AUTO: 0.03 X10*3/UL (ref 0–0.7)
EOSINOPHIL NFR BLD AUTO: 1 %
ERYTHROCYTE [DISTWIDTH] IN BLOOD BY AUTOMATED COUNT: 12.2 % (ref 11.5–14.5)
HCT VFR BLD AUTO: 43.3 % (ref 41–52)
HGB BLD-MCNC: 15.1 G/DL (ref 13.5–17.5)
IMM GRANULOCYTES # BLD AUTO: 0.01 X10*3/UL (ref 0–0.7)
IMM GRANULOCYTES NFR BLD AUTO: 0.3 % (ref 0–0.9)
LYMPHOCYTES # BLD AUTO: 1.41 X10*3/UL (ref 1.2–4.8)
LYMPHOCYTES NFR BLD AUTO: 48.6 %
MCH RBC QN AUTO: 30.6 PG (ref 26–34)
MCHC RBC AUTO-ENTMCNC: 34.9 G/DL (ref 32–36)
MCV RBC AUTO: 88 FL (ref 80–100)
MONOCYTES # BLD AUTO: 0.29 X10*3/UL (ref 0.1–1)
MONOCYTES NFR BLD AUTO: 10 %
NEUTROPHILS # BLD AUTO: 1.14 X10*3/UL (ref 1.2–7.7)
NEUTROPHILS NFR BLD AUTO: 39.4 %
NRBC BLD-RTO: 0 /100 WBCS (ref 0–0)
PLATELET # BLD AUTO: 244 X10*3/UL (ref 150–450)
RBC # BLD AUTO: 4.94 X10*6/UL (ref 4.5–5.9)
WBC # BLD AUTO: 2.9 X10*3/UL (ref 4.4–11.3)

## 2025-04-18 PROCEDURE — 85025 COMPLETE CBC W/AUTO DIFF WBC: CPT | Performed by: NURSE PRACTITIONER

## 2025-04-18 PROCEDURE — 99283 EMERGENCY DEPT VISIT LOW MDM: CPT | Performed by: EMERGENCY MEDICINE

## 2025-04-18 PROCEDURE — 36415 COLL VENOUS BLD VENIPUNCTURE: CPT | Performed by: NURSE PRACTITIONER

## 2025-04-18 PROCEDURE — 99283 EMERGENCY DEPT VISIT LOW MDM: CPT | Performed by: NURSE PRACTITIONER

## 2025-04-18 ASSESSMENT — COLUMBIA-SUICIDE SEVERITY RATING SCALE - C-SSRS
2. HAVE YOU ACTUALLY HAD ANY THOUGHTS OF KILLING YOURSELF?: NO
6. HAVE YOU EVER DONE ANYTHING, STARTED TO DO ANYTHING, OR PREPARED TO DO ANYTHING TO END YOUR LIFE?: NO
1. IN THE PAST MONTH, HAVE YOU WISHED YOU WERE DEAD OR WISHED YOU COULD GO TO SLEEP AND NOT WAKE UP?: NO

## 2025-04-18 ASSESSMENT — PAIN - FUNCTIONAL ASSESSMENT: PAIN_FUNCTIONAL_ASSESSMENT: 0-10

## 2025-04-18 NOTE — ED PROVIDER NOTES
HPI   Chief Complaint   Patient presents with    Black or Bloody Stool       HPI    This is a 34 year old male with no significant past medical history presents to the ED with rectal bleeding.  He was seen on 4/16 with the same where labs were done where showed no anemia. He also had an exam and no external hemorrhoids were appreciated and he refused rectal exam.   He was discharged and was able to make an appointment with GI for today but they called him back and cancelled the appointment as that specific provider does not take new patients.   Upon arrival to the ED, he appears very stable with no dizziness, lightheadedness, chest pain or dyspnea     Patient History   Medical History[1]  Surgical History[2]  Family History[3]  Social History[4]    Physical Exam   ED Triage Vitals [04/18/25 1233]   Temperature Heart Rate Respirations BP   36.7 °C (98 °F) 81 16 131/86      Pulse Ox Temp Source Heart Rate Source Patient Position   95 % Temporal -- --      BP Location FiO2 (%)     -- --       Physical Exam  Constitutional:       Appearance: Normal appearance.   HENT:      Head: Normocephalic and atraumatic.   Cardiovascular:      Rate and Rhythm: Normal rate and regular rhythm.      Pulses: Normal pulses.      Heart sounds: Normal heart sounds.   Pulmonary:      Effort: Pulmonary effort is normal.      Breath sounds: Normal breath sounds.   Abdominal:      Palpations: Abdomen is soft.   Musculoskeletal:         General: Normal range of motion.      Cervical back: Normal range of motion and neck supple.   Skin:     General: Skin is warm and dry.   Neurological:      General: No focal deficit present.      Mental Status: He is alert and oriented to person, place, and time.   Psychiatric:         Mood and Affect: Mood normal.         Behavior: Behavior normal.           ED Course & MDM   Diagnoses as of 04/18/25 1421   Rectal bleeding                 No data recorded     Meet Coma Scale Score: 15 (04/18/25 1234 :  Lashon Serna RN)                           Medical Decision Making  This is a 34 year old male with no significant past medical history presents to the ED with rectal bleeding.  He was seen on the 16th for the same and was subsequently discharged and was supposed to be seen by GI today who cancelled.   On exam he had no external or internal hemorrhoids and no obvious bleeding. Repeat CBC was entirely stable and an appointment was made for him with GI and he was discharged.   The patient was staffed with Dr. Hidalgo.         Procedure  Procedures       [1]   Past Medical History:  Diagnosis Date    Dislocation of carpometacarpal joint, right, initial encounter 05/02/2023    Fracture of neck of metacarpal bone 03/03/2024   [2] History reviewed. No pertinent surgical history.  [3] No family history on file.  [4]   Social History  Tobacco Use    Smoking status: Never    Smokeless tobacco: Never   Substance Use Topics    Alcohol use: Never    Drug use: Yes     Types: Marijuana        Sheila Faust, ALEXANDER-CNP, DNP  04/18/25 5081

## 2025-04-18 NOTE — ED TRIAGE NOTES
Pt presents to the ED c/o light brown stools with small amounts of bright red blood. Pt states that he was seen once before and was told he may have an internal hemorrhoid.

## 2025-05-30 ENCOUNTER — APPOINTMENT (OUTPATIENT)
Dept: PRIMARY CARE | Facility: CLINIC | Age: 35
End: 2025-05-30
Payer: COMMERCIAL

## 2025-05-30 VITALS
HEART RATE: 62 BPM | SYSTOLIC BLOOD PRESSURE: 111 MMHG | BODY MASS INDEX: 27.55 KG/M2 | DIASTOLIC BLOOD PRESSURE: 73 MMHG | OXYGEN SATURATION: 96 % | HEIGHT: 69 IN | WEIGHT: 186 LBS

## 2025-05-30 DIAGNOSIS — Z76.89 ENCOUNTER TO ESTABLISH CARE: Primary | ICD-10-CM

## 2025-05-30 DIAGNOSIS — Z13.220 SCREENING FOR HYPERLIPIDEMIA: ICD-10-CM

## 2025-05-30 DIAGNOSIS — K92.1 BLOOD IN STOOL: ICD-10-CM

## 2025-05-30 PROCEDURE — 3008F BODY MASS INDEX DOCD: CPT

## 2025-05-30 PROCEDURE — 1036F TOBACCO NON-USER: CPT

## 2025-05-30 PROCEDURE — 99214 OFFICE O/P EST MOD 30 MIN: CPT

## 2025-05-30 ASSESSMENT — PATIENT HEALTH QUESTIONNAIRE - PHQ9
SUM OF ALL RESPONSES TO PHQ9 QUESTIONS 1 AND 2: 0
2. FEELING DOWN, DEPRESSED OR HOPELESS: NOT AT ALL
1. LITTLE INTEREST OR PLEASURE IN DOING THINGS: NOT AT ALL

## 2025-05-30 ASSESSMENT — ENCOUNTER SYMPTOMS
HEADACHES: 0
CHILLS: 0
VOICE CHANGE: 0
HEMATURIA: 0
CONSTIPATION: 0
UNEXPECTED WEIGHT CHANGE: 0
FEVER: 0
MYALGIAS: 0
WHEEZING: 0
FATIGUE: 0
FREQUENCY: 0
VOMITING: 0
NERVOUS/ANXIOUS: 0
COUGH: 0
RECTAL PAIN: 0
BLOOD IN STOOL: 0
LOSS OF SENSATION IN FEET: 0
WEAKNESS: 0
SHORTNESS OF BREATH: 0
PALPITATIONS: 0
SINUS PAIN: 0
SPEECH DIFFICULTY: 0
SINUS PRESSURE: 0
NAUSEA: 0
ARTHRALGIAS: 0
POLYPHAGIA: 0
DIFFICULTY URINATING: 0
ABDOMINAL PAIN: 0
ANAL BLEEDING: 0
NUMBNESS: 0
BACK PAIN: 0
DEPRESSION: 0
OCCASIONAL FEELINGS OF UNSTEADINESS: 0
DIZZINESS: 0
CONFUSION: 0
DIARRHEA: 0

## 2025-05-30 ASSESSMENT — PAIN SCALES - GENERAL: PAINLEVEL_OUTOF10: 0-NO PAIN

## 2025-05-30 NOTE — PROGRESS NOTES
Subjective   Patient ID: Andre Jamil is a 34 y.o. male who presents for Establish Care (E.R follow up/blood in stool).    HPI   Patient is a 33 yo male with no significant past medical history who presents today for ED follow up after having blood in his stool and to establish care. Patient was seen in ED on 4/18- for blood in his stool. States that he was experiencing blood in his stool a few times, in the toilet bowl and on the toilet paper when he wiped, which prompted him to go to the ER in April. States he was drinking a lot of alcohol and believes this was cause. He stopped drinking and states that all symptoms have resolved since then. Denies any current abdominal pain, blood in the stool, nausea, vomiting, constipation and diarrhea. Denies rectal pain and bleeding. States he maintains adequate fluid and fiber intake.       previous PCP- [facility]    Single, 2 daughters- 3 and 9 months- works as [employer]   Specialists-none   UTD dental and vision UTD    PMhx significant medical hx   -no significant history   PShx:   -right hand surgery   Social hx:  no etoh, no tobacco use, does use marijuana    Watches diet and exercise- active at work   Immunizations- not UTD- patient states he does not get these      FMhx: mother- HTN  father- none  siblings - 8 siblings - unsure of history   Past medical, surgical, social, and family history all reviewed     patient states feeling well, no complaints at this time   denies N/V, headache, dizziness, CP, SOB, palpitations, edema, numbness, tingling, weakness          Review of Systems   Constitutional:  Negative for chills, fatigue, fever and unexpected weight change.   HENT:  Negative for congestion, ear pain, sinus pressure, sinus pain and voice change.    Respiratory:  Negative for cough, shortness of breath and wheezing.    Cardiovascular:  Negative for chest pain, palpitations and leg swelling.   Gastrointestinal:  Negative for abdominal pain, anal bleeding, blood in  "stool, constipation, diarrhea, nausea, rectal pain and vomiting.   Endocrine: Negative for cold intolerance, heat intolerance, polyphagia and polyuria.   Genitourinary:  Negative for difficulty urinating, frequency, hematuria and urgency.   Musculoskeletal:  Negative for arthralgias, back pain, gait problem and myalgias.   Skin:  Negative for rash.   Allergic/Immunologic: Negative for environmental allergies.   Neurological:  Negative for dizziness, syncope, speech difficulty, weakness, numbness and headaches.   Psychiatric/Behavioral:  Negative for confusion. The patient is not nervous/anxious.        Objective   /73 (BP Location: Left arm, Patient Position: Sitting, BP Cuff Size: Adult)   Pulse 62   Ht 1.753 m (5' 9\")   Wt 84.4 kg (186 lb)   SpO2 96%   BMI 27.47 kg/m²     Physical Exam  Constitutional:       Appearance: Normal appearance.   HENT:      Head: Normocephalic and atraumatic.   Cardiovascular:      Rate and Rhythm: Normal rate and regular rhythm.      Pulses: Normal pulses.      Heart sounds: Normal heart sounds.   Pulmonary:      Effort: Pulmonary effort is normal.      Breath sounds: Normal breath sounds.   Abdominal:      General: Abdomen is flat. Bowel sounds are normal.      Palpations: Abdomen is soft.   Musculoskeletal:         General: Normal range of motion.      Cervical back: Normal range of motion.   Skin:     General: Skin is warm and dry.   Neurological:      General: No focal deficit present.      Mental Status: He is alert and oriented to person, place, and time. Mental status is at baseline.   Psychiatric:         Mood and Affect: Mood normal.         Behavior: Behavior normal.         Thought Content: Thought content normal.         Judgment: Judgment normal.       Assessment/Plan   Assessment & Plan  Screening for hyperlipidemia  -Lipid panel done today- will follow up on results   Orders:    Lipid Panel; Future    Encounter to establish care  -Schedule an annual physical " at your convenience.   -Will follow up on lab results.   Orders:    CBC; Future    Comprehensive Metabolic Panel; Future    Blood in stool  -No further episodes since ER visit in April.   -Patient educated on red flag symptoms and to go to ED if any new or worsening of symptoms.  -Go to ER if you experience any chest pain, SOB, headaches, dizziness or blurry vision.       complexity visit of 35+  minutes face-to-face with at least half of the time discussing  Results of my examination, clinical findings, impression and diagnoses discussed with the patient as well as results of the latest test/lab work. The patient was in agreement with the plan and verbalized a good understanding of instructions and plans for treatment. At the end of the visit today, the patient felt that all questions had been answered satisfactorily. The patient was pleased with the visit and very appreciative for the care rendered.

## 2025-06-03 ENCOUNTER — TELEPHONE (OUTPATIENT)
Dept: PRIMARY CARE | Facility: CLINIC | Age: 35
End: 2025-06-03
Payer: COMMERCIAL

## 2025-06-03 DIAGNOSIS — K92.1 BLOOD IN STOOL: ICD-10-CM

## 2025-06-03 NOTE — TELEPHONE ENCOUNTER
Called and spoke with patient about referral to GI due to history of  blood in stool and making an appointment with them. Patient understands and will do this. Go to ER if any new or worsening of symptoms.